# Patient Record
Sex: FEMALE | Race: ASIAN | NOT HISPANIC OR LATINO
[De-identification: names, ages, dates, MRNs, and addresses within clinical notes are randomized per-mention and may not be internally consistent; named-entity substitution may affect disease eponyms.]

---

## 2019-07-20 ENCOUNTER — APPOINTMENT (OUTPATIENT)
Dept: OTOLARYNGOLOGY | Facility: CLINIC | Age: 64
End: 2019-07-20
Payer: COMMERCIAL

## 2019-07-20 VITALS
DIASTOLIC BLOOD PRESSURE: 79 MMHG | HEART RATE: 73 BPM | HEIGHT: 65 IN | SYSTOLIC BLOOD PRESSURE: 143 MMHG | WEIGHT: 147 LBS | BODY MASS INDEX: 24.49 KG/M2

## 2019-07-20 DIAGNOSIS — H61.21 IMPACTED CERUMEN, RIGHT EAR: ICD-10-CM

## 2019-07-20 PROCEDURE — 69210 REMOVE IMPACTED EAR WAX UNI: CPT

## 2019-07-20 PROCEDURE — 99203 OFFICE O/P NEW LOW 30 MIN: CPT | Mod: 25

## 2019-07-20 NOTE — HISTORY OF PRESENT ILLNESS
[de-identified] : 62yo woman with R ear fullness\par about 2 wks ago after using headphnes\par no otalgia or otorrhea\par but was worried abut hearing\par since then has gotten better\par no otologic history\par no h/o dry skin

## 2019-07-20 NOTE — PHYSICAL EXAM
[de-identified] : dry cerumen, removed; TF ML AC>BC dawit, hears finger rub [Midline] : trachea located in midline position [Normal] : no rashes

## 2019-07-20 NOTE — REASON FOR VISIT
[Initial Evaluation] : an initial evaluation for [FreeTextEntry2] : patient complaint of right ear not hearing well and complaint of itchiness, left ear same symptoms but it comes and goes

## 2019-07-23 ENCOUNTER — APPOINTMENT (OUTPATIENT)
Dept: OTOLARYNGOLOGY | Facility: CLINIC | Age: 64
End: 2019-07-23

## 2019-12-09 ENCOUNTER — APPOINTMENT (OUTPATIENT)
Dept: OBGYN | Facility: CLINIC | Age: 64
End: 2019-12-09
Payer: COMMERCIAL

## 2019-12-09 VITALS
HEIGHT: 65 IN | HEART RATE: 93 BPM | DIASTOLIC BLOOD PRESSURE: 78 MMHG | SYSTOLIC BLOOD PRESSURE: 145 MMHG | BODY MASS INDEX: 24.83 KG/M2 | WEIGHT: 149 LBS

## 2019-12-09 DIAGNOSIS — F32.9 MAJOR DEPRESSIVE DISORDER, SINGLE EPISODE, UNSPECIFIED: ICD-10-CM

## 2019-12-09 PROCEDURE — 99213 OFFICE O/P EST LOW 20 MIN: CPT | Mod: 25

## 2019-12-09 PROCEDURE — 99386 PREV VISIT NEW AGE 40-64: CPT

## 2019-12-09 NOTE — COUNSELING
[Breast Self Exam] : breast self exam [Nutrition] : nutrition [Vitamins/Supplements] : vitamins/supplements [Exercise] : exercise [Other ___] : [unfilled]

## 2019-12-09 NOTE — PHYSICAL EXAM
[Awake] : awake [Alert] : alert [Soft] : soft [Oriented x3] : oriented to person, place, and time [Vulvar Atrophy] : vulvar atrophy [Normal] : vagina [Atrophy] : atrophy [Uterine Adnexae] : were not tender and not enlarged [No Bleeding] : there was no active vaginal bleeding [Mass] : no breast mass [Acute Distress] : no acute distress [Nipple Discharge] : no nipple discharge [Axillary LAD] : no axillary lymphadenopathy [Tender] : non tender

## 2019-12-10 ENCOUNTER — APPOINTMENT (OUTPATIENT)
Dept: OBGYN | Facility: CLINIC | Age: 64
End: 2019-12-10
Payer: COMMERCIAL

## 2019-12-10 ENCOUNTER — ASOB RESULT (OUTPATIENT)
Age: 64
End: 2019-12-10

## 2019-12-10 PROCEDURE — 76830 TRANSVAGINAL US NON-OB: CPT

## 2019-12-12 LAB
CYTOLOGY CVX/VAG DOC THIN PREP: NORMAL
HPV HIGH+LOW RISK DNA PNL CVX: NOT DETECTED

## 2019-12-31 ENCOUNTER — OUTPATIENT (OUTPATIENT)
Dept: OUTPATIENT SERVICES | Facility: HOSPITAL | Age: 64
LOS: 1 days | End: 2019-12-31
Payer: COMMERCIAL

## 2019-12-31 ENCOUNTER — APPOINTMENT (OUTPATIENT)
Dept: ULTRASOUND IMAGING | Facility: IMAGING CENTER | Age: 64
End: 2019-12-31
Payer: COMMERCIAL

## 2019-12-31 ENCOUNTER — APPOINTMENT (OUTPATIENT)
Dept: MAMMOGRAPHY | Facility: IMAGING CENTER | Age: 64
End: 2019-12-31
Payer: COMMERCIAL

## 2019-12-31 DIAGNOSIS — Z01.419 ENCOUNTER FOR GYNECOLOGICAL EXAMINATION (GENERAL) (ROUTINE) WITHOUT ABNORMAL FINDINGS: ICD-10-CM

## 2019-12-31 DIAGNOSIS — Z98.89 OTHER SPECIFIED POSTPROCEDURAL STATES: Chronic | ICD-10-CM

## 2019-12-31 DIAGNOSIS — Z90.49 ACQUIRED ABSENCE OF OTHER SPECIFIED PARTS OF DIGESTIVE TRACT: Chronic | ICD-10-CM

## 2019-12-31 DIAGNOSIS — R92.2 INCONCLUSIVE MAMMOGRAM: ICD-10-CM

## 2019-12-31 PROCEDURE — 77067 SCR MAMMO BI INCL CAD: CPT

## 2019-12-31 PROCEDURE — 77067 SCR MAMMO BI INCL CAD: CPT | Mod: 26

## 2019-12-31 PROCEDURE — 76641 ULTRASOUND BREAST COMPLETE: CPT | Mod: 26,50

## 2019-12-31 PROCEDURE — 77063 BREAST TOMOSYNTHESIS BI: CPT | Mod: 26

## 2019-12-31 PROCEDURE — 76641 ULTRASOUND BREAST COMPLETE: CPT

## 2019-12-31 PROCEDURE — 77063 BREAST TOMOSYNTHESIS BI: CPT

## 2020-01-22 ENCOUNTER — LABORATORY RESULT (OUTPATIENT)
Age: 65
End: 2020-01-22

## 2020-01-22 ENCOUNTER — APPOINTMENT (OUTPATIENT)
Dept: OBGYN | Facility: CLINIC | Age: 65
End: 2020-01-22
Payer: COMMERCIAL

## 2020-01-22 VITALS
DIASTOLIC BLOOD PRESSURE: 79 MMHG | WEIGHT: 148 LBS | BODY MASS INDEX: 24.66 KG/M2 | HEIGHT: 65 IN | SYSTOLIC BLOOD PRESSURE: 139 MMHG | HEART RATE: 96 BPM

## 2020-01-22 PROCEDURE — 58100 BIOPSY OF UTERUS LINING: CPT

## 2020-01-22 NOTE — PROCEDURE
[Endometrial Biopsy] : Endometrial biopsy [Benefits] : benefits [Risks] : risks [Alternatives] : alternatives [Infection] : infection [Patient] : patient [Bleeding] : bleeding [Allergic Reaction] : allergic reaction [Uterine Perforation] : uterine perforation [Pain] : pain [CONSENT OBTAINED] : written consent was obtained prior to the procedure. [Neg Pregnancy Test] : a pregnancy test was negative [Betadine] : Betadine [1%] : 1% [None] : none [Without Epi] : without epinephrine [Tenaculum] : a single toothed tenaculum [Anteverted] : anteverted [Easy Passage] : allowed easy passage of a uterine sound without dilation [Scant] : a scant [Pipelle] : a Pipelle endometrial suction curette [Sent to Histology] : the specimen was place in buffered formalin and sent for pathlogy [Tolerated Well] : the patient tolerated the procedure well [No Complications] : there were no complications

## 2020-01-30 LAB — CORE LAB BIOPSY: NORMAL

## 2020-02-06 ENCOUNTER — OUTPATIENT (OUTPATIENT)
Dept: OUTPATIENT SERVICES | Facility: HOSPITAL | Age: 65
LOS: 1 days | End: 2020-02-06
Payer: COMMERCIAL

## 2020-02-06 VITALS
HEIGHT: 63 IN | TEMPERATURE: 98 F | RESPIRATION RATE: 16 BRPM | HEART RATE: 72 BPM | OXYGEN SATURATION: 98 % | WEIGHT: 145.95 LBS | DIASTOLIC BLOOD PRESSURE: 84 MMHG | SYSTOLIC BLOOD PRESSURE: 126 MMHG

## 2020-02-06 DIAGNOSIS — N84.0 POLYP OF CORPUS UTERI: ICD-10-CM

## 2020-02-06 DIAGNOSIS — Z98.890 OTHER SPECIFIED POSTPROCEDURAL STATES: Chronic | ICD-10-CM

## 2020-02-06 DIAGNOSIS — Z98.49 CATARACT EXTRACTION STATUS, UNSPECIFIED EYE: Chronic | ICD-10-CM

## 2020-02-06 DIAGNOSIS — Z98.89 OTHER SPECIFIED POSTPROCEDURAL STATES: Chronic | ICD-10-CM

## 2020-02-06 DIAGNOSIS — Z90.49 ACQUIRED ABSENCE OF OTHER SPECIFIED PARTS OF DIGESTIVE TRACT: Chronic | ICD-10-CM

## 2020-02-06 LAB
ANION GAP SERPL CALC-SCNC: 13 MMO/L — SIGNIFICANT CHANGE UP (ref 7–14)
BUN SERPL-MCNC: 26 MG/DL — HIGH (ref 7–23)
CALCIUM SERPL-MCNC: 9.8 MG/DL — SIGNIFICANT CHANGE UP (ref 8.4–10.5)
CHLORIDE SERPL-SCNC: 102 MMOL/L — SIGNIFICANT CHANGE UP (ref 98–107)
CO2 SERPL-SCNC: 25 MMOL/L — SIGNIFICANT CHANGE UP (ref 22–31)
CREAT SERPL-MCNC: 0.65 MG/DL — SIGNIFICANT CHANGE UP (ref 0.5–1.3)
GLUCOSE SERPL-MCNC: 79 MG/DL — SIGNIFICANT CHANGE UP (ref 70–99)
HCT VFR BLD CALC: 47 % — HIGH (ref 34.5–45)
HGB BLD-MCNC: 14.7 G/DL — SIGNIFICANT CHANGE UP (ref 11.5–15.5)
MCHC RBC-ENTMCNC: 30.1 PG — SIGNIFICANT CHANGE UP (ref 27–34)
MCHC RBC-ENTMCNC: 31.3 % — LOW (ref 32–36)
MCV RBC AUTO: 96.3 FL — SIGNIFICANT CHANGE UP (ref 80–100)
NRBC # FLD: 0 K/UL — SIGNIFICANT CHANGE UP (ref 0–0)
PLATELET # BLD AUTO: 327 K/UL — SIGNIFICANT CHANGE UP (ref 150–400)
PMV BLD: 10.2 FL — SIGNIFICANT CHANGE UP (ref 7–13)
POTASSIUM SERPL-MCNC: 4.1 MMOL/L — SIGNIFICANT CHANGE UP (ref 3.5–5.3)
POTASSIUM SERPL-SCNC: 4.1 MMOL/L — SIGNIFICANT CHANGE UP (ref 3.5–5.3)
RBC # BLD: 4.88 M/UL — SIGNIFICANT CHANGE UP (ref 3.8–5.2)
RBC # FLD: 12.4 % — SIGNIFICANT CHANGE UP (ref 10.3–14.5)
SODIUM SERPL-SCNC: 140 MMOL/L — SIGNIFICANT CHANGE UP (ref 135–145)
WBC # BLD: 6.66 K/UL — SIGNIFICANT CHANGE UP (ref 3.8–10.5)
WBC # FLD AUTO: 6.66 K/UL — SIGNIFICANT CHANGE UP (ref 3.8–10.5)

## 2020-02-06 PROCEDURE — 93010 ELECTROCARDIOGRAM REPORT: CPT

## 2020-02-06 RX ORDER — SODIUM CHLORIDE 9 MG/ML
3 INJECTION INTRAMUSCULAR; INTRAVENOUS; SUBCUTANEOUS ONCE
Refills: 0 | Status: DISCONTINUED | OUTPATIENT
Start: 2020-02-10 | End: 2020-03-03

## 2020-02-06 RX ORDER — SODIUM CHLORIDE 9 MG/ML
1000 INJECTION, SOLUTION INTRAVENOUS
Refills: 0 | Status: DISCONTINUED | OUTPATIENT
Start: 2020-02-10 | End: 2020-03-03

## 2020-02-06 NOTE — H&P PST ADULT - HISTORY OF PRESENT ILLNESS
65 y/o  female   with pre op dx of menopausal disorder. According to pt, Last year she experienced post menopausal bleeding over 3 times; last was the summer of 2013. A sonogram was done in December 2013 that showed thick lining of the uterine wall. Scheduled for dilation and curettage hysteroscopy on 3/11/14.     pap smear 12/08/2019 showed "polyp" 65 y/o  female with no significant PMH presents to PST for pre op evaluation - S/P pap smear 12/08/2019 showed "polyp". Now scheduled for Dilation curettage hysteroscopy with Symp[hion on 02/10/2020

## 2020-02-06 NOTE — H&P PST ADULT - GASTROINTESTINAL DETAILS
soft/nontender/bowel sounds normal/no masses palpable/no distention no distention/no rebound tenderness/soft/no bruit/nontender/no masses palpable/bowel sounds normal

## 2020-02-06 NOTE — H&P PST ADULT - NEGATIVE GENERAL GENITOURINARY SYMPTOMS
no dysuria/no flank pain L/no renal colic/no hematuria/no flank pain R/no bladder infections/no urinary hesitancy/no urine discoloration/no gas in urine

## 2020-02-06 NOTE — H&P PST ADULT - NEGATIVE BREAST SYMPTOMS
no nipple discharge L/no breast lump L/no breast tenderness L/no breast tenderness R/no breast lump R/no nipple discharge R

## 2020-02-06 NOTE — H&P PST ADULT - NSICDXPASTSURGICALHX_GEN_ALL_CORE_FT
PAST SURGICAL HISTORY:  S/P appendectomy     S/P cholecystectomy     S/P dilation and curettage 2014    Status post cataract extraction bilateral; 3 years ago

## 2020-02-06 NOTE — H&P PST ADULT - NSANTHOSAYNRD_GEN_A_CORE
No. CRYSTAL screening performed.  STOP BANG Legend: 0-2 = LOW Risk; 3-4 = INTERMEDIATE Risk; 5-8 = HIGH Risk

## 2020-02-06 NOTE — H&P PST ADULT - NEGATIVE GENERAL SYMPTOMS
no polyphagia/no sweating/no anorexia/no weight loss/no chills/no fatigue/no fever/no weight gain/no malaise

## 2020-02-06 NOTE — H&P PST ADULT - NSICDXPROBLEM_GEN_ALL_CORE_FT
PROBLEM DIAGNOSES  Problem: Polyp of corpus uteri  Assessment and Plan: Scheduled for Dilation curettage hysteroscopy with Ray on 02/10/2020. Pre op instructions, famotidine given and explained. Pt verbalized understanding.

## 2020-02-06 NOTE — H&P PST ADULT - NSICDXPASTMEDICALHX_GEN_ALL_CORE_FT
PAST MEDICAL HISTORY:  Cataract of both eyes     Menopausal disorder     Stress incontinence     Urge incontinence of urine PAST MEDICAL HISTORY:  Cataract of both eyes     Menopausal disorder     Polyp of corpus uteri     Stress incontinence     Urge incontinence of urine

## 2020-02-06 NOTE — H&P PST ADULT - NEGATIVE OPHTHALMOLOGIC SYMPTOMS
no diplopia/no lacrimation R/no blurred vision L/corrective lenses/no blurred vision R/no discharge R/no irritation L/no photophobia/no lacrimation L/no pain L/no loss of vision R/no discharge L/no pain R/no irritation R/no loss of vision L

## 2020-02-06 NOTE — H&P PST ADULT - NEGATIVE GASTROINTESTINAL SYMPTOMS
no diarrhea/no nausea/no vomiting/no steatorrhea/no hiccoughs/no constipation/no abdominal pain/no melena/no jaundice

## 2020-02-06 NOTE — H&P PST ADULT - ACTIVITY
Assumed care of pt from Triage pt c/o abd pain and associated with N/V/D X 2 days no fever or sob does house chores

## 2020-02-07 NOTE — ASU PATIENT PROFILE, ADULT - PSH
S/P appendectomy    S/P cholecystectomy    S/P dilation and curettage  2014  Status post cataract extraction  bilateral; 3 years ago

## 2020-02-09 ENCOUNTER — TRANSCRIPTION ENCOUNTER (OUTPATIENT)
Age: 65
End: 2020-02-09

## 2020-02-10 ENCOUNTER — APPOINTMENT (OUTPATIENT)
Dept: OBGYN | Facility: HOSPITAL | Age: 65
End: 2020-02-10

## 2020-02-10 ENCOUNTER — RESULT REVIEW (OUTPATIENT)
Age: 65
End: 2020-02-10

## 2020-02-10 ENCOUNTER — OUTPATIENT (OUTPATIENT)
Dept: OUTPATIENT SERVICES | Facility: HOSPITAL | Age: 65
LOS: 1 days | Discharge: HOME | End: 2020-02-10
Payer: COMMERCIAL

## 2020-02-10 VITALS
HEART RATE: 68 BPM | OXYGEN SATURATION: 100 % | RESPIRATION RATE: 13 BRPM | SYSTOLIC BLOOD PRESSURE: 140 MMHG | DIASTOLIC BLOOD PRESSURE: 73 MMHG

## 2020-02-10 VITALS
WEIGHT: 145.95 LBS | HEIGHT: 63 IN | TEMPERATURE: 99 F | RESPIRATION RATE: 16 BRPM | DIASTOLIC BLOOD PRESSURE: 71 MMHG | SYSTOLIC BLOOD PRESSURE: 137 MMHG | OXYGEN SATURATION: 98 % | HEART RATE: 75 BPM

## 2020-02-10 DIAGNOSIS — N84.0 POLYP OF CORPUS UTERI: ICD-10-CM

## 2020-02-10 DIAGNOSIS — Z90.49 ACQUIRED ABSENCE OF OTHER SPECIFIED PARTS OF DIGESTIVE TRACT: Chronic | ICD-10-CM

## 2020-02-10 DIAGNOSIS — Z98.49 CATARACT EXTRACTION STATUS, UNSPECIFIED EYE: Chronic | ICD-10-CM

## 2020-02-10 DIAGNOSIS — Z98.89 OTHER SPECIFIED POSTPROCEDURAL STATES: Chronic | ICD-10-CM

## 2020-02-10 DIAGNOSIS — Z98.890 OTHER SPECIFIED POSTPROCEDURAL STATES: Chronic | ICD-10-CM

## 2020-02-10 PROCEDURE — 58558 HYSTEROSCOPY BIOPSY: CPT

## 2020-02-10 PROCEDURE — 88305 TISSUE EXAM BY PATHOLOGIST: CPT | Mod: 26

## 2020-02-10 RX ORDER — SODIUM CHLORIDE 9 MG/ML
1000 INJECTION, SOLUTION INTRAVENOUS
Refills: 0 | Status: DISCONTINUED | OUTPATIENT
Start: 2020-02-10 | End: 2020-03-03

## 2020-02-10 RX ADMIN — SODIUM CHLORIDE 125 MILLILITER(S): 9 INJECTION, SOLUTION INTRAVENOUS at 10:05

## 2020-02-10 NOTE — ASU DISCHARGE PLAN (ADULT/PEDIATRIC) - NURSING INSTRUCTIONS
You were given intravenous TYLENOL for pain management at _9:30am Please DO NOT take any products containing TYLENOL or ACETAMINOPHEN, such as VICODIN, PERCOCET, EXCEDRIN, and any over-the-counter cold medication for the next 6 hours (until _3:30pm). DO NOT TAKE MORE THAN 3000 MG OF TYLENOL in a 24 hour period.     You were given intravenous TORADOL for pain management at 9:45am. Please DO NOT take ibuprofen containing products such as MOTRIN or ADVIL, or any other NSAIDs (Non-Steroidal Anti-Inflammatory Drugs) such as ALEVE for the next 6 hours (until _3:45pm_).

## 2020-02-10 NOTE — ASU DISCHARGE PLAN (ADULT/PEDIATRIC) - CARE PROVIDER_API CALL
Cinda Epps)  Obstetrics and Gynecology  [address] [place] [address]  Phone: [phone]  Fax: [phone]  Follow Up Time:

## 2020-02-10 NOTE — BRIEF OPERATIVE NOTE - NSICDXBRIEFPROCEDURE_GEN_ALL_CORE_FT
PROCEDURES:  Diagnostic hysteroscopy with dilation and curettage of uterus 10-Feb-2020 10:04:01  Rogerio Chadwick

## 2020-02-10 NOTE — BRIEF OPERATIVE NOTE - OPERATION/FINDINGS
small posterior wall polyp in uterine cavity, grossly atrophic uterine cavity, b/l ostia identified and normal

## 2020-02-13 LAB — SURGICAL PATHOLOGY STUDY: SIGNIFICANT CHANGE UP

## 2020-03-11 ENCOUNTER — APPOINTMENT (OUTPATIENT)
Dept: OBGYN | Facility: CLINIC | Age: 65
End: 2020-03-11
Payer: COMMERCIAL

## 2020-03-11 VITALS
HEIGHT: 65 IN | DIASTOLIC BLOOD PRESSURE: 73 MMHG | WEIGHT: 150 LBS | BODY MASS INDEX: 24.99 KG/M2 | HEART RATE: 85 BPM | SYSTOLIC BLOOD PRESSURE: 150 MMHG

## 2020-03-11 DIAGNOSIS — N84.0 POLYP OF CORPUS UTERI: ICD-10-CM

## 2020-03-11 DIAGNOSIS — R93.5 ABNORMAL FINDINGS ON DIAGNOSTIC IMAGING OF OTHER ABDOMINAL REGIONS, INCLUDING RETROPERITONEUM: ICD-10-CM

## 2020-03-11 PROCEDURE — 99213 OFFICE O/P EST LOW 20 MIN: CPT

## 2020-12-21 PROBLEM — N84.0 POLYP OF CORPUS UTERI: Chronic | Status: ACTIVE | Noted: 2020-02-06

## 2020-12-31 NOTE — H&P PST ADULT - SOURCE OF INFORMATION, PROFILE
The following labs labeled with pt sticker and tubed:     [] Butcher Edwards   [] on ice   [] Blue   [x] Green/yellow (pedi tube)  [] Green/black [] on ice  [] Pink  [] Red  [] Yellow     [] COVID-19 swab    [] Rapid     [] Urine Sample  [] Pelvic Cultures    [] Blood Cultures          Jeff Her RN  12/31/20 [id number] patient

## 2021-04-22 ENCOUNTER — APPOINTMENT (OUTPATIENT)
Dept: OTOLARYNGOLOGY | Facility: CLINIC | Age: 66
End: 2021-04-22
Payer: MEDICARE

## 2021-04-22 PROCEDURE — 92557 COMPREHENSIVE HEARING TEST: CPT

## 2021-04-22 PROCEDURE — 99215 OFFICE O/P EST HI 40 MIN: CPT

## 2021-04-22 PROCEDURE — 92567 TYMPANOMETRY: CPT

## 2021-04-22 NOTE — HISTORY OF PRESENT ILLNESS
[de-identified] : 64 y/o woman presents for c/o ear pain and fluid in her ear about two weeks ago.  This has since resolved and she feels it is related to "ear bud use".  Pt. also with c/o mild intermittent constant tinnitus, especially in quiet environment.  Recently had some hearing loss on workplace evaluation.  Would like her hearing checked.  Denies ear infections or previous ear surgery.

## 2021-05-03 ENCOUNTER — APPOINTMENT (OUTPATIENT)
Dept: OBGYN | Facility: CLINIC | Age: 66
End: 2021-05-03
Payer: MEDICARE

## 2021-05-03 VITALS
HEIGHT: 65 IN | BODY MASS INDEX: 25.16 KG/M2 | DIASTOLIC BLOOD PRESSURE: 92 MMHG | HEART RATE: 90 BPM | SYSTOLIC BLOOD PRESSURE: 165 MMHG | WEIGHT: 151 LBS | TEMPERATURE: 97.1 F

## 2021-05-03 VITALS — HEART RATE: 88 BPM | DIASTOLIC BLOOD PRESSURE: 81 MMHG | SYSTOLIC BLOOD PRESSURE: 143 MMHG

## 2021-05-03 DIAGNOSIS — R10.2 PELVIC AND PERINEAL PAIN: ICD-10-CM

## 2021-05-03 DIAGNOSIS — Z01.419 ENCOUNTER FOR GYNECOLOGICAL EXAMINATION (GENERAL) (ROUTINE) W/OUT ABNORMAL FINDINGS: ICD-10-CM

## 2021-05-03 PROCEDURE — G0101: CPT

## 2021-05-04 ENCOUNTER — APPOINTMENT (OUTPATIENT)
Dept: OBGYN | Facility: CLINIC | Age: 66
End: 2021-05-04

## 2021-05-11 ENCOUNTER — APPOINTMENT (OUTPATIENT)
Dept: OBGYN | Facility: CLINIC | Age: 66
End: 2021-05-11
Payer: MEDICARE

## 2021-05-11 ENCOUNTER — ASOB RESULT (OUTPATIENT)
Age: 66
End: 2021-05-11

## 2021-05-11 PROCEDURE — 76830 TRANSVAGINAL US NON-OB: CPT

## 2021-05-18 ENCOUNTER — NON-APPOINTMENT (OUTPATIENT)
Age: 66
End: 2021-05-18

## 2021-05-20 ENCOUNTER — APPOINTMENT (OUTPATIENT)
Dept: MAMMOGRAPHY | Facility: IMAGING CENTER | Age: 66
End: 2021-05-20
Payer: MEDICARE

## 2021-05-20 ENCOUNTER — OUTPATIENT (OUTPATIENT)
Dept: OUTPATIENT SERVICES | Facility: HOSPITAL | Age: 66
LOS: 1 days | End: 2021-05-20
Payer: MEDICARE

## 2021-05-20 ENCOUNTER — RESULT REVIEW (OUTPATIENT)
Age: 66
End: 2021-05-20

## 2021-05-20 ENCOUNTER — APPOINTMENT (OUTPATIENT)
Dept: RADIOLOGY | Facility: IMAGING CENTER | Age: 66
End: 2021-05-20
Payer: MEDICARE

## 2021-05-20 DIAGNOSIS — Z01.419 ENCOUNTER FOR GYNECOLOGICAL EXAMINATION (GENERAL) (ROUTINE) WITHOUT ABNORMAL FINDINGS: ICD-10-CM

## 2021-05-20 DIAGNOSIS — Z90.49 ACQUIRED ABSENCE OF OTHER SPECIFIED PARTS OF DIGESTIVE TRACT: Chronic | ICD-10-CM

## 2021-05-20 DIAGNOSIS — Z98.49 CATARACT EXTRACTION STATUS, UNSPECIFIED EYE: Chronic | ICD-10-CM

## 2021-05-20 DIAGNOSIS — Z98.890 OTHER SPECIFIED POSTPROCEDURAL STATES: Chronic | ICD-10-CM

## 2021-05-20 DIAGNOSIS — Z98.89 OTHER SPECIFIED POSTPROCEDURAL STATES: Chronic | ICD-10-CM

## 2021-05-20 PROCEDURE — 77067 SCR MAMMO BI INCL CAD: CPT | Mod: 26

## 2021-05-20 PROCEDURE — 77080 DXA BONE DENSITY AXIAL: CPT | Mod: 26

## 2021-05-20 PROCEDURE — 77063 BREAST TOMOSYNTHESIS BI: CPT | Mod: 26

## 2021-05-20 PROCEDURE — 77067 SCR MAMMO BI INCL CAD: CPT

## 2021-05-20 PROCEDURE — 77063 BREAST TOMOSYNTHESIS BI: CPT

## 2021-05-20 PROCEDURE — 77080 DXA BONE DENSITY AXIAL: CPT

## 2021-06-02 ENCOUNTER — NON-APPOINTMENT (OUTPATIENT)
Age: 66
End: 2021-06-02

## 2021-06-03 ENCOUNTER — NON-APPOINTMENT (OUTPATIENT)
Age: 66
End: 2021-06-03

## 2021-07-26 ENCOUNTER — APPOINTMENT (OUTPATIENT)
Dept: OBGYN | Facility: CLINIC | Age: 66
End: 2021-07-26
Payer: MEDICARE

## 2021-07-26 VITALS
HEART RATE: 83 BPM | HEIGHT: 65 IN | DIASTOLIC BLOOD PRESSURE: 84 MMHG | BODY MASS INDEX: 25.87 KG/M2 | TEMPERATURE: 97.6 F | WEIGHT: 155.25 LBS | SYSTOLIC BLOOD PRESSURE: 151 MMHG

## 2021-07-26 PROCEDURE — 99214 OFFICE O/P EST MOD 30 MIN: CPT

## 2021-07-26 NOTE — COUNSELING
[FreeTextEntry1] : Alysha 65-year-old para 4  4 with predominantly urge incontinence, small cystocele and generalized slight pelvic floor weakness and occasional stress incontinence.  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  She chooses physical therapy for now and will see me back again in 6 to 8 months to assess her progress and other therapies will be added as necessary\par \par Renal ultrasound was ordered to rule out structural issues including hydronephrosis, urine analysis culture and cytology was sent, postvoid residual is normal, and voiding diary was given.  Also literature provided and 3D and computer modeling utilized to demonstrate the patient's situation to her\par \par Her gynecologist is Dr. Trudy Humphrey however there was a discharge present today and some cervical bleeding predominantly in all likelihood due to atrophy therefore Pap and affirm with HPV was taken and I will report this back to Dr. Huertas and to the patient\par \par Pelvic sonogram was done and revealed a 4.5 mm probable polyp as she has had polypectomy D&C x2 all benign in the past and she is aware that if she has any staining or bleeding she is to report this to me and/or Dr. Huertas and appropriate measures will be taken including investigation; some adnexal tenderness will re-order ta tv pelvic sono as ovaries were not seen at last sono and adnexal exam although nl with me and Dr. Gardiner, some gas present and difficult to get deep in to adnexae. also fh+M ov ca pm\par \par The patient has had the opportunity to ask questions which have been answered to her apparent satisfaction\par KEILY

## 2021-07-27 LAB
APPEARANCE: CLEAR
BACTERIA UR CULT: NORMAL
BACTERIA: NEGATIVE
BILIRUBIN URINE: NEGATIVE
BLOOD URINE: NEGATIVE
CANDIDA VAG CYTO: NOT DETECTED
COLOR: NORMAL
G VAGINALIS+PREV SP MTYP VAG QL MICRO: DETECTED
GLUCOSE QUALITATIVE U: NEGATIVE
HPV HIGH+LOW RISK DNA PNL CVX: NOT DETECTED
HYALINE CASTS: 0 /LPF
KETONES URINE: NEGATIVE
LEUKOCYTE ESTERASE URINE: NEGATIVE
MICROSCOPIC-UA: NORMAL
NITRITE URINE: NEGATIVE
PH URINE: 6
PROTEIN URINE: NEGATIVE
RED BLOOD CELLS URINE: 1 /HPF
SPECIFIC GRAVITY URINE: 1.02
SQUAMOUS EPITHELIAL CELLS: 0 /HPF
T VAGINALIS VAG QL WET PREP: NOT DETECTED
URINE CYTOLOGY: NORMAL
UROBILINOGEN URINE: NORMAL
WHITE BLOOD CELLS URINE: 0 /HPF

## 2021-07-29 LAB — CYTOLOGY CVX/VAG DOC THIN PREP: ABNORMAL

## 2021-08-03 ENCOUNTER — NON-APPOINTMENT (OUTPATIENT)
Age: 66
End: 2021-08-03

## 2021-08-11 ENCOUNTER — APPOINTMENT (OUTPATIENT)
Dept: ULTRASOUND IMAGING | Facility: IMAGING CENTER | Age: 66
End: 2021-08-11
Payer: MEDICARE

## 2021-08-11 ENCOUNTER — OUTPATIENT (OUTPATIENT)
Dept: OUTPATIENT SERVICES | Facility: HOSPITAL | Age: 66
LOS: 1 days | End: 2021-08-11
Payer: MEDICARE

## 2021-08-11 ENCOUNTER — RESULT REVIEW (OUTPATIENT)
Age: 66
End: 2021-08-11

## 2021-08-11 DIAGNOSIS — N81.9 FEMALE GENITAL PROLAPSE, UNSPECIFIED: ICD-10-CM

## 2021-08-11 DIAGNOSIS — Z98.89 OTHER SPECIFIED POSTPROCEDURAL STATES: Chronic | ICD-10-CM

## 2021-08-11 DIAGNOSIS — Z98.49 CATARACT EXTRACTION STATUS, UNSPECIFIED EYE: Chronic | ICD-10-CM

## 2021-08-11 DIAGNOSIS — Z98.890 OTHER SPECIFIED POSTPROCEDURAL STATES: Chronic | ICD-10-CM

## 2021-08-11 DIAGNOSIS — Z90.49 ACQUIRED ABSENCE OF OTHER SPECIFIED PARTS OF DIGESTIVE TRACT: Chronic | ICD-10-CM

## 2021-08-11 PROCEDURE — 76856 US EXAM PELVIC COMPLETE: CPT | Mod: 26

## 2021-08-11 PROCEDURE — 76830 TRANSVAGINAL US NON-OB: CPT | Mod: 26

## 2021-08-11 PROCEDURE — 76856 US EXAM PELVIC COMPLETE: CPT

## 2021-08-11 PROCEDURE — 76770 US EXAM ABDO BACK WALL COMP: CPT | Mod: 26

## 2021-08-11 PROCEDURE — 76830 TRANSVAGINAL US NON-OB: CPT

## 2021-08-11 PROCEDURE — 76770 US EXAM ABDO BACK WALL COMP: CPT

## 2021-08-13 ENCOUNTER — NON-APPOINTMENT (OUTPATIENT)
Age: 66
End: 2021-08-13

## 2021-08-22 NOTE — HISTORY OF PRESENT ILLNESS
[FreeTextEntry1] : Alysha 65-year-old para 4  4 with complaints of urgency frequency, double voiding and occasional urge incontinence with nocturia 2-3 times per night.  Occasionally with a full bladder she does have some stress incontinence symptoms and no bulging or vaginal or pelvic pressure symptoms per se.  She is referred from gynecologist Dr. Trudy Huertas and read recent mammogram was normal pelvic ultrasound reveals a 4.5 mm probable endometrial polyp\par She has had 2 D&Cs because of polyps and bleeding however she has not had bleeding in quite some time. [Mammogramdate] : 12/19 [PapSmeardate] : 12/19 [PGHxTotal] : 4 [[facility]] : 4 [[facility]] : 4 [Cystocele (Obstetric)] : no [Uterine Prolapse] : no [Vaginal Wall Prolapse] : no [Rectal Prolapse] : no [Stress Incontinence] : no [Urge Incontinence Of Urine] : no [Unable To Restrain Bowel Movement] : moderate [x2] : nocturia two times a night [Urinary Stream Starts And Stops] : no [Incomplete Emptying Of Bladder] : no [Urinary Frequency] : no [Feelings Of Urinary Urgency] : no [Pain During Urination (Dysuria)] : no [Urinary Tract Infection] : no [Hematuria] : no [Constipation Obstructed Defecation] : no [Incomplete Emptying Of Stool] : no [Stool Visible Blood] : no [Diarrhea] : no [Pelvic Pain] : no [Vaginal Pain] : no [Vulvar Pain] : no [Bladder Pain] : no [Rectal Pain] : no [Back Pain] : no [Sexual Dysfunction, NOS] : no [] : no [LMP unknown] : LMP unknown [postmenopausal] : postmenopausal [N] : Patient is not sexually active [Y] : Positive pregnancy history

## 2021-08-22 NOTE — PHYSICAL EXAM
[Mass] : no breast mass [Tender] : no tenderness [Nipple Discharge] : no nipple discharge [Mass (___ Cm)] : no ~M [unfilled] abdominal mass was palpated [Tenderness] : ~T no ~M abdominal tenderness observed [H/Smegaly] : no hepatosplenomegaly [Supraclavicular LAD] : no adenopathy noted in supraclavicular lymph nodes [Axillary LAD] : no adenopathy was noted in axillary nodes [Inguinal LAD] : no adenopathy was noted in the inguinal lymph nodes [Rash/Lesion] : no rash or lesion was noted [Estrogen Effect] : no estrogen effect was observed [FreeTextEntry4] : grade 1 cystocele, no prolapse [Chaperone Present] : A chaperone was present in the examining room during all aspects of the physical examination [No Acute Distress] : in no acute distress [Well developed] : well developed [Well Nourished] : ~L well nourished [Good Hygeine] : demonstrates good hygeine [Oriented x3] : oriented to person, place, and time [Normal Memory] : ~T memory was ~L unimpaired [Normal Mood/Affect] : mood and affect are normal [Normal Lung Sounds] : the lungs were clear to auscultation [Respirations regular] : ~T respiratory rate was regular [Rate & Rhythm Regular] : ~T heart rate and rhythm were normal [No Edema] : ~T edema was not present [Supple] : ~T the neck demonstrated no ~M decrease in suppleness [Thyroid Normal] : the thyroid ~T showed no abnormalities [Symmetrical] : the neck was ~L symmetrical [Warm and Dry] : was warm and dry to touch [Turgor Normal] : skin turgor ~T was normal [Normal Gait] : gait was normal [No Joint Swelling] : there was no joint swelling [No Clubbing, Cyanosis] : no clubbing or cyanosis of the fingernails [Normal Strength/Tone] : muscle strength and tone were normal [Vulvar Atrophy] : vulvar atrophy [Normal Appearance] : general appearance was normal [Atrophy] : atrophy [4] : 4 [Aa ____] : Aa [unfilled] [Ba ____] : Ba [unfilled] [C ____] : C [unfilled] [GH ____] : GH [unfilled] [PB ____] : PB [unfilled] [TVL ____] : TVL  [unfilled] [Ap ____] : Ap [unfilled] [Bp ____] : Bp [unfilled] [D ____] : D [unfilled] [] : I [Normal rectal exam] : was normal [None] : no [Examination Of The Breasts] : a normal appearance [No Discharge] : no discharge [No Masses] : no breast masses were palpable [Labia Majora] : normal [Labia Minora] : normal [Cystocele] : a cystocele [Normal] : normal [Uterine Adnexae] : normal [No Tenderness] : no tenderness [Nl Sphincter Tone] : normal sphincter tone

## 2021-08-22 NOTE — REASON FOR VISIT
[Initial Visit ___] : an initial visit for [unfilled] [Questionnaire Received] : Patient questionnaire received [Intake Form Reviewed] : Patient intake form with past medical history, surgical history, family history and social history reviewed today [Urinary Incontinence] : urinary incontinence [Urinary Urgency] : urinary urgency [Urine Frequency] : urine frequency [Bothersome Level: ____/10] : Bothersome Level: [unfilled]/10 [FreeTextEntry2] : gsui at capacity [Follow-Up Visit_____] : a follow-up visit for [unfilled]

## 2021-08-22 NOTE — OB HISTORY
[Abnormal Pap Smear] : normal pap smear [Taking Estrogens] : is not taking estrogen replacement [Abnormal Bleeding] : without bleeding [Sexually Active] : is not sexually active [Total Preg ___] : : [unfilled] [Vaginal ___] : [unfilled] vaginal delivery(s) [unknown] : the patient is unsure of the date of her LMP [Normal Amount/Duration] : was of a normal amount and duration [Regular Cycle Intervals] : periods have been regular [Last Pap Smear ___] : date of last pap smear was on [unfilled]

## 2021-08-23 ENCOUNTER — APPOINTMENT (OUTPATIENT)
Dept: OBGYN | Facility: CLINIC | Age: 66
End: 2021-08-23
Payer: MEDICARE

## 2021-08-23 VITALS
HEIGHT: 65 IN | TEMPERATURE: 97.9 F | SYSTOLIC BLOOD PRESSURE: 146 MMHG | DIASTOLIC BLOOD PRESSURE: 77 MMHG | HEART RATE: 82 BPM

## 2021-08-23 PROCEDURE — 99213 OFFICE O/P EST LOW 20 MIN: CPT

## 2021-08-23 NOTE — PHYSICAL EXAM
[Mass] : no breast mass [Tender] : no tenderness [Nipple Discharge] : no nipple discharge [Mass (___ Cm)] : no ~M [unfilled] abdominal mass was palpated [Tenderness] : ~T no ~M abdominal tenderness observed [H/Smegaly] : no hepatosplenomegaly [Supraclavicular LAD] : no adenopathy noted in supraclavicular lymph nodes [Axillary LAD] : no adenopathy was noted in axillary nodes [Inguinal LAD] : no adenopathy was noted in the inguinal lymph nodes [Rash/Lesion] : no rash or lesion was noted [Estrogen Effect] : no estrogen effect was observed [FreeTextEntry4] : grade 1 cystocele, no prolapse

## 2021-08-23 NOTE — COUNSELING
[FreeTextEntry1] : Alysha 65-year-old para 4  4 with predominantly urge incontinence, small cystocele and generalized slight pelvic floor weakness and occasional stress incontinence.  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  She chooses physical therapy for now at [facility] and will see me back again in 6 to 8 months to assess her progress and other therapies will be added as necessary\par \par Renal ultrasound was ordered to rule out structural issues including hydronephrosis, urine analysis culture and cytology was sent, postvoid residual is normal, and voiding diary was given.  Also literature provided and 3D and computer modeling utilized to demonstrate the patient's situation to her-normal pelvic and renal sono \par \par Her gynecologist is Dr. Trudy Humphrey however there was a discharge present today and some cervical bleeding predominantly in all likelihood due to atrophy therefore Pap and affirm with HPV was taken and I will report this back to Dr. Huertas and to the patient-neg pap/hpv  affirm was + and treated, veronique today\par \par Pelvic sonogram was done and revealed a 4.5 mm probable polyp as she has had polypectomy D&C x2 all benign in the past and she is aware that if she has any staining or bleeding she is to report this to me and/or Dr. Huertas and appropriate measures will be taken including investigation; some adnexal tenderness will re-order ta tv pelvic sono as ovaries were not seen at last sono and adnexal exam although nl with me and Dr. Gardiner, some gas present and difficult to get deep in to adnexae. also fh+M ov ca pm-NORMAL PELVIC AND RENAL SONO AS ABOVE RPT AS CLINICALLY INDICATED\par \par The patient has had the opportunity to ask questions which have been answered to her apparent satisfaction\par JRABIN\par \par Patient comes in today for test of cure for bacterial vaginosis admitting that she only used the medication for 1 night and I will repeat the test of cure today and it is pending if it still positive she will complete the course another 4 nights and a test of cure sometime in September or October and a follow-up in January for her biannual visit, she is going to continue to try physical therapy and make an appointment for formal physical therapy at [facility] physical therapy for her incontinence and pelvic organ prolapse, additionally if her test of cure is negative I will just monitor her again for that in .  All questions answered to the patient's apparent satisfaction\tim SANON

## 2021-08-23 NOTE — HISTORY OF PRESENT ILLNESS
[] : no [FreeTextEntry1] : Alysha 65-year-old para 4  4 with complaints of urgency frequency, double voiding and occasional urge incontinence with nocturia 2-3 times per night.  Occasionally with a full bladder she does have some stress incontinence symptoms and no bulging or vaginal or pelvic pressure symptoms per se.  She is referred from gynecologist Dr. Trudy Huertas and read recent mammogram was normal pelvic ultrasound reveals a 4.5 mm probable endometrial polyp\par She has had 2 D&Cs because of polyps and bleeding however she has not had bleeding in quite some time. [Mammogramdate] : 12/19 [PapSmeardate] : 12/19 [PGHxTotal] : 4 [[facility] [[facility]xLiving] : 4

## 2021-08-23 NOTE — OB HISTORY
[Abnormal Pap Smear] : normal pap smear [Taking Estrogens] : is not taking estrogen replacement [Abnormal Bleeding] : without bleeding [Sexually Active] : is not sexually active

## 2021-08-24 LAB
CANDIDA VAG CYTO: NOT DETECTED
G VAGINALIS+PREV SP MTYP VAG QL MICRO: DETECTED
T VAGINALIS VAG QL WET PREP: NOT DETECTED

## 2021-08-24 RX ORDER — CLINDAMYCIN PHOSPHATE 20 MG/G
2 CREAM VAGINAL
Qty: 1 | Refills: 3 | Status: ACTIVE | COMMUNITY
Start: 2021-07-27 | End: 1900-01-01

## 2021-08-29 NOTE — H&P PST ADULT - RESPIRATORY
Discharge to home with plans to start Prilosec May use Percocet for pain and follow-up with your outpatient provider as well as possibility of being scoped as discussed.  
detailed exam

## 2021-09-10 ENCOUNTER — APPOINTMENT (OUTPATIENT)
Dept: OBGYN | Facility: CLINIC | Age: 66
End: 2021-09-10
Payer: MEDICARE

## 2021-09-10 VITALS — HEART RATE: 88 BPM | SYSTOLIC BLOOD PRESSURE: 125 MMHG | HEIGHT: 65 IN | DIASTOLIC BLOOD PRESSURE: 74 MMHG

## 2021-09-10 DIAGNOSIS — N39.0 URINARY TRACT INFECTION, SITE NOT SPECIFIED: ICD-10-CM

## 2021-09-10 DIAGNOSIS — A49.9 URINARY TRACT INFECTION, SITE NOT SPECIFIED: ICD-10-CM

## 2021-09-10 PROCEDURE — 99213 OFFICE O/P EST LOW 20 MIN: CPT | Mod: 25

## 2021-09-10 PROCEDURE — 51798 US URINE CAPACITY MEASURE: CPT

## 2021-09-10 NOTE — COUNSELING
[FreeTextEntry1] : Alysha 65-year-old para 4  4 with predominantly urge incontinence, small cystocele and generalized slight pelvic floor weakness and occasional stress incontinence.  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  [facility] and will see me back again in 6 to 8 months to assess her progress and other therapies will be added as necessary\par \par Renal ultrasound was ordered to rule out structural issues including hydronephrosis, urine analysis culture and cytology was sent, postvoid residual is normal, and voiding diary was given.  Also literature provided and 3D and computer modeling utilized to demonstrate the patient's situation to her-normal pelvic and renal sono \par \par Her gynecologist is Dr. Trudy Humphrey however there was a discharge present today and some cervical bleeding predominantly in all likelihood due to atrophy therefore Pap and affirm with HPV was taken and I will report this back to Dr. Huertas and to the patient-neg pap/hpv  affirm was + and treated, veronique today 9/10/21 with affirm and ua uc; pvr nl\par \par Pelvic sonogram was done and revealed a 4.5 mm probable polyp as she has had polypectomy D&C x2 all benign in the past and she is aware that if she has any staining or bleeding she is to report this to me and/or Dr. Huertas and appropriate measures will be taken including investigation; some adnexal tenderness will re-order ta tv pelvic sono as ovaries were not seen at last sono and adnexal exam although nl with me and Dr. Gardiner, some gas present and difficult to get deep in to adnexae. also fh+M ov ca pm-NORMAL PELVIC AND RENAL SONO AS ABOVE RPT AS CLINICALLY INDICATED\par \par The patient has had the opportunity to ask questions which have been answered to her apparent satisfaction\par JRABIN\par \par Patient comes in today for test of cure for bacterial vaginosis admitting that she only used the medication for 1 night and I will repeat the test of cure today and it is pending if it still positive she will complete the course another 4 nights and a test of cure sometime in September or October and a follow-up in January for her biannual visit, she is going to continue to try physical therapy and make an appointment for formal physical therapy at [facility] physical therapy for her incontinence and pelvic organ prolapse, additionally if her test of cure is negative I will just monitor her again for that in .  All questions answered to the patient's apparent satisfaction\tim SANON

## 2021-09-10 NOTE — HISTORY OF PRESENT ILLNESS
[] : no [FreeTextEntry1] : Alysha 65-year-old para 4  4 with complaints of urgency frequency, double voiding and occasional urge incontinence with nocturia 2-3 times per night.  Occasionally with a full bladder she does have some stress incontinence symptoms and no bulging or vaginal or pelvic pressure symptoms per se.  She is referred from gynecologist Dr. Trudy Huertas and read recent mammogram was normal pelvic ultrasound reveals a 4.5 mm probable endometrial polyp\par She has had 2 D&Cs because of polyps and bleeding however she has not had bleeding in quite some time. [Mammogramdate] : 12/19 [PapSmeardate] : 12/19 [PGHxTotal] : 4 [[facility] [[facility] : 4

## 2021-09-11 LAB
APPEARANCE: CLEAR
BACTERIA UR CULT: NORMAL
BACTERIA: NEGATIVE
BILIRUBIN URINE: NEGATIVE
BLOOD URINE: NEGATIVE
CANDIDA VAG CYTO: NOT DETECTED
COLOR: YELLOW
G VAGINALIS+PREV SP MTYP VAG QL MICRO: NOT DETECTED
GLUCOSE QUALITATIVE U: NEGATIVE
HYALINE CASTS: 0 /LPF
KETONES URINE: NEGATIVE
LEUKOCYTE ESTERASE URINE: NEGATIVE
MICROSCOPIC-UA: NORMAL
NITRITE URINE: NEGATIVE
PH URINE: 5.5
PROTEIN URINE: NORMAL
RED BLOOD CELLS URINE: 3 /HPF
SPECIFIC GRAVITY URINE: 1.02
SQUAMOUS EPITHELIAL CELLS: 2 /HPF
T VAGINALIS VAG QL WET PREP: NOT DETECTED
UROBILINOGEN URINE: NORMAL
WHITE BLOOD CELLS URINE: 1 /HPF

## 2021-09-13 ENCOUNTER — NON-APPOINTMENT (OUTPATIENT)
Age: 66
End: 2021-09-13

## 2022-04-03 ENCOUNTER — APPOINTMENT (OUTPATIENT)
Dept: OBGYN | Facility: CLINIC | Age: 67
End: 2022-04-03
Payer: MEDICARE

## 2022-04-03 VITALS — DIASTOLIC BLOOD PRESSURE: 80 MMHG | SYSTOLIC BLOOD PRESSURE: 146 MMHG | HEIGHT: 65 IN | HEART RATE: 91 BPM

## 2022-04-03 DIAGNOSIS — N95.2 POSTMENOPAUSAL ATROPHIC VAGINITIS: ICD-10-CM

## 2022-04-03 PROCEDURE — 99214 OFFICE O/P EST MOD 30 MIN: CPT

## 2022-04-03 RX ORDER — COVID-19 ANTIGEN TEST
KIT MISCELLANEOUS
Qty: 8 | Refills: 0 | Status: ACTIVE | COMMUNITY
Start: 2022-03-05

## 2022-04-03 NOTE — OB HISTORY
[Total Preg ___] : : [unfilled] [Vaginal ___] : [unfilled] vaginal delivery(s) [unknown] : the patient is unsure of the date of her LMP [Normal Amount/Duration] : was of a normal amount and duration [Regular Cycle Intervals] : periods have been regular [Last Pap Smear ___] : date of last pap smear was on [unfilled] [Abnormal Pap Smear] : normal pap smear [Taking Estrogens] : is not taking estrogen replacement [Abnormal Bleeding] : without bleeding [Sexually Active] : is not sexually active

## 2022-04-03 NOTE — PHYSICAL EXAM
[Chaperone Present] : A chaperone was present in the examining room during all aspects of the physical examination [No Acute Distress] : in no acute distress [Well developed] : well developed [Well Nourished] : ~L well nourished [Good Hygeine] : demonstrates good hygeine [Normal Memory] : ~T memory was ~L unimpaired [Oriented x3] : oriented to person, place, and time [Normal Mood/Affect] : mood and affect are normal [Normal Lung Sounds] : the lungs were clear to auscultation [Respirations regular] : ~T respiratory rate was regular [Rate & Rhythm Regular] : ~T heart rate and rhythm were normal [No Edema] : ~T edema was not present [Supple] : ~T the neck demonstrated no ~M decrease in suppleness [Thyroid Normal] : the thyroid ~T showed no abnormalities [Symmetrical] : the neck was ~L symmetrical [Warm and Dry] : was warm and dry to touch [Turgor Normal] : skin turgor ~T was normal [Normal Gait] : gait was normal [No Joint Swelling] : there was no joint swelling [No Clubbing, Cyanosis] : no clubbing or cyanosis of the fingernails [Normal Strength/Tone] : muscle strength and tone were normal [Vulvar Atrophy] : vulvar atrophy [Normal Appearance] : general appearance was normal [Atrophy] : atrophy [4] : 4 [C ____] : C [unfilled] [GH ____] : GH [unfilled] [PB ____] : PB [unfilled] [TVL ____] : TVL  [unfilled] [Ap ____] : Ap [unfilled] [Bp ____] : Bp [unfilled] [D ____] : D [unfilled] [Normal rectal exam] : was normal [None] : no [Examination Of The Breasts] : a normal appearance [No Discharge] : no discharge [No Masses] : no breast masses were palpable [Labia Minora] : normal [Labia Majora] : normal [Cystocele] : a cystocele [Normal] : normal [No Tenderness] : no tenderness [Uterine Adnexae] : normal [Nl Sphincter Tone] : normal sphincter tone [FreeTextEntry1] : nati [Mass] : no breast mass [Tender] : no tenderness [Nipple Discharge] : no nipple discharge [Mass (___ Cm)] : no ~M [unfilled] abdominal mass was palpated [Tenderness] : ~T no ~M abdominal tenderness observed [H/Smegaly] : no hepatosplenomegaly [Supraclavicular LAD] : no adenopathy noted in supraclavicular lymph nodes [Axillary LAD] : no adenopathy was noted in axillary nodes [Inguinal LAD] : no adenopathy was noted in the inguinal lymph nodes [Rash/Lesion] : no rash or lesion was noted [Estrogen Effect] : no estrogen effect was observed [Aa ____] : Aa [unfilled] [Ba ____] : Ba [unfilled] [] : I [FreeTextEntry4] : grade 1 cystocele, no prolapse

## 2022-04-03 NOTE — COUNSELING
[FreeTextEntry1] : Alysha 65-year-old para 4  4 with predominantly urge incontinence, small cystocele and generalized slight pelvic floor weakness and occasional stress incontinence.  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  She chooses physical therapy for now at [facility] and will see me back again in 6 to 8 months to assess her progress and other therapies will be added as necessary\par \par Renal ultrasound was ordered to rule out structural issues including hydronephrosis, urine analysis culture and cytology was sent, postvoid residual is normal, and voiding diary was given.  Also literature provided and 3D and computer modeling utilized to demonstrate the patient's situation to her-normal pelvic and renal sono \par \par Her gynecologist is Dr. Trudy Humphrey however there was a discharge present today and some cervical bleeding predominantly in all likelihood due to atrophy therefore Pap and affirm with HPV was taken and I will report this back to Dr. Huertas and to the patient-neg pap/hpv  affirm was + and treated, veronique today 9/10/21 with affirm and ua uc; pvr nl\par \par Pelvic sonogram was done and revealed a 4.5 mm probable polyp as she has had polypectomy D&C x2 all benign in the past and she is aware that if she has any staining or bleeding she is to report this to me and/or Dr. Huertas and appropriate measures will be taken including investigation; some adnexal tenderness will re-order ta tv pelvic sono as ovaries were not seen at last sono and adnexal exam although nl with me and Dr. Gardienr, some gas present and difficult to get deep in to adnexae. also fh+M ov ca pm-NORMAL PELVIC AND RENAL SONO AS ABOVE RPT AS CLINICALLY INDICATED\par \par The patient has had the opportunity to ask questions which have been answered to her apparent satisfaction\par JRABIN\par \par Patient comes in today for test of cure for bacterial vaginosis admitting that she only used the medication for 1 night and I will repeat the test of cure today and it is pending if it still positive she will complete the course another 4 nights and a test of cure sometime in September or October and a follow-up in January for her biannual visit, she is going to continue to try physical therapy and make an appointment for formal physical therapy at [facility] physical therapy for her incontinence and pelvic organ prolapse, additionally if her test of cure is negative I will just monitor her again for that in .  All questions answered to the patient's apparent satisfaction\par JMR\par \par April 3, 2022\par Patient presents for follow-up visit having completed her first course with physical therapy at [facility] physical therapy and I have also suggested some electrical stimulation transvaginally due to the urge component-she wakes up on the average of twice per night however she does not go to sleep until 2 AM\par Due to family stress particularly with her to affected children she has been gaining weight eating carbohydrates more than she would like and not exercising.  She shared this information today and said she is her own support when I inquired as to whether or not she would like a referral for behavioral health to help her with her family stress she declines at present\par \par Her physical exam is improved and that her pelvic organ prolapse is now mild to moderate rather than moderate in the central cystocele is somewhat improved see POP Q scores for details-however it is clear that the wait the extra weight is pressing on her bladder and further weakening the pelvic floor and connective tissue-\par -the cause of her leakage is basically urethral detrusor facilitative reflex with the urge component neuromuscular and muscular fascial tearing leading to the pelvic organ prolapse which contributes to the stress component-again this is due to loop 2 efferent stim and parasympathetic stimulation with a funneled bladder neck\par \par Treatment options include continued physical therapy which she is going to be continuing, weight loss and dietary adjustment.  This also includes doing nothing pessary medications or surgery and she chooses continued physical therapy and possibly posterior tibial nerve stimulation as well as weight loss and exercise\par \par She is now going to be seeing me for GYN care and her annual visit with me today was normal normal breast and pelvic exam except for the prolapse.\par \par Urine analysis culture and cytology was sent and her postvoid residual is 3 cc\par Mammogram was ordered for May\par Pelvic and renal sono for September these were normal last year\par Bone density was normal last year will be repeated  or \par She states her vitamin D level was low and she is now on 3000 international units a day with her internist Dr. Wendy Quan\par Vaccines were reviewed with the patient and she will review this with Dr. Quan to see which vaccines she is due for including a second Covid booster and possibly Tdap and pneumonia as well as shingles\par Her colonoscopy may be due when she will check with her gastroenterologist she had polyps approximately 3 years ago at the last colonoscopy\par Her eye exam is up-to-date\par Again she declines behavioral therapy\par \par Appointment was made for her to see me in 2022 all questions were answered to the patient's apparent satisfaction\par

## 2022-04-03 NOTE — HISTORY OF PRESENT ILLNESS
[Cystocele (Obstetric)] : no [Uterine Prolapse] : no [Vaginal Wall Prolapse] : no [Rectal Prolapse] : no [Stress Incontinence] : no [Urge Incontinence Of Urine] : no [Unable To Restrain Bowel Movement] : moderate [Urinary Stream Starts And Stops] : no [x2] : nocturia two times a night [Incomplete Emptying Of Bladder] : no [Urinary Frequency] : no [Feelings Of Urinary Urgency] : no [Pain During Urination (Dysuria)] : no [Urinary Tract Infection] : no [Hematuria] : no [Constipation Obstructed Defecation] : no [Incomplete Emptying Of Stool] : no [Stool Visible Blood] : no [Diarrhea] : no [Pelvic Pain] : no [Vaginal Pain] : no [Vulvar Pain] : no [Bladder Pain] : no [Rectal Pain] : no [Back Pain] : no [Sexual Dysfunction, NOS] : no [LMP unknown] : LMP unknown [postmenopausal] : postmenopausal [N] : Patient is not sexually active [Y] : Positive pregnancy history [] : no [FreeTextEntry1] : Alysha 65-year-old para 4  4 with complaints of urgency frequency, double voiding and occasional urge incontinence with nocturia 2-3 times per night.  Occasionally with a full bladder she does have some stress incontinence symptoms and no bulging or vaginal or pelvic pressure symptoms per se.  She is referred from gynecologist Dr. Trudy Huertas and read recent mammogram was normal pelvic ultrasound reveals a 4.5 mm probable endometrial polyp\par She has had 2 D&Cs because of polyps and bleeding however she has not had bleeding in quite some time. [Mammogramdate] : 12/19 [PGHxTotal] : 4 [PapSmeardate] : 12/19 [[facility] [[facility]] : 4

## 2022-04-04 LAB
APPEARANCE: CLEAR
BACTERIA: NEGATIVE
BILIRUBIN URINE: NEGATIVE
BLOOD URINE: NEGATIVE
COLOR: NORMAL
GLUCOSE QUALITATIVE U: NEGATIVE
HYALINE CASTS: 0 /LPF
KETONES URINE: NEGATIVE
LEUKOCYTE ESTERASE URINE: NEGATIVE
MICROSCOPIC-UA: NORMAL
NITRITE URINE: NEGATIVE
PH URINE: 7
PROTEIN URINE: NORMAL
RED BLOOD CELLS URINE: 2 /HPF
SPECIFIC GRAVITY URINE: 1.02
SQUAMOUS EPITHELIAL CELLS: 1 /HPF
UROBILINOGEN URINE: NORMAL
WHITE BLOOD CELLS URINE: 0 /HPF

## 2022-04-05 LAB — BACTERIA UR CULT: NORMAL

## 2022-04-07 LAB — URINE CYTOLOGY: NORMAL

## 2022-04-13 ENCOUNTER — TRANSCRIPTION ENCOUNTER (OUTPATIENT)
Age: 67
End: 2022-04-13

## 2022-04-21 ENCOUNTER — APPOINTMENT (OUTPATIENT)
Dept: OTOLARYNGOLOGY | Facility: CLINIC | Age: 67
End: 2022-04-21
Payer: MEDICARE

## 2022-04-21 VITALS
SYSTOLIC BLOOD PRESSURE: 139 MMHG | WEIGHT: 159 LBS | BODY MASS INDEX: 27.14 KG/M2 | HEART RATE: 98 BPM | DIASTOLIC BLOOD PRESSURE: 74 MMHG | HEIGHT: 64 IN

## 2022-04-21 DIAGNOSIS — H93.8X1 OTHER SPECIFIED DISORDERS OF RIGHT EAR: ICD-10-CM

## 2022-04-21 PROCEDURE — 99212 OFFICE O/P EST SF 10 MIN: CPT

## 2022-04-21 PROCEDURE — 92504 EAR MICROSCOPY EXAMINATION: CPT

## 2022-04-21 RX ORDER — POLYMYXIN B SULFATE AND TRIMETHOPRIM 10000; 1 [USP'U]/ML; MG/ML
10000-0.1 SOLUTION OPHTHALMIC
Qty: 10 | Refills: 0 | Status: DISCONTINUED | COMMUNITY
Start: 2021-12-14 | End: 2022-04-21

## 2022-04-21 RX ORDER — ZOLPIDEM TARTRATE 5 MG/1
5 TABLET ORAL
Qty: 20 | Refills: 0 | Status: DISCONTINUED | COMMUNITY
Start: 2021-01-06 | End: 2022-04-21

## 2022-04-21 RX ORDER — FAMOTIDINE 20 MG/1
20 TABLET, FILM COATED ORAL
Qty: 30 | Refills: 0 | Status: DISCONTINUED | COMMUNITY
Start: 2021-01-06 | End: 2022-04-21

## 2022-04-21 RX ORDER — MUPIROCIN 20 MG/G
2 OINTMENT TOPICAL
Qty: 22 | Refills: 0 | Status: DISCONTINUED | COMMUNITY
Start: 2022-03-29 | End: 2022-04-21

## 2022-04-21 RX ORDER — SULFAMETHOXAZOLE AND TRIMETHOPRIM 800; 160 MG/1; MG/1
800-160 TABLET ORAL
Qty: 14 | Refills: 0 | Status: DISCONTINUED | COMMUNITY
Start: 2022-03-31 | End: 2022-04-21

## 2022-04-21 RX ORDER — CEPHALEXIN 500 MG/1
500 CAPSULE ORAL
Qty: 20 | Refills: 0 | Status: DISCONTINUED | COMMUNITY
Start: 2021-07-05 | End: 2022-04-21

## 2022-04-21 RX ORDER — CHROMIUM 200 MCG
TABLET ORAL
Refills: 0 | Status: ACTIVE | COMMUNITY

## 2022-04-21 RX ORDER — PREDNISONE 20 MG/1
20 TABLET ORAL
Qty: 7 | Refills: 0 | Status: DISCONTINUED | COMMUNITY
Start: 2022-03-30 | End: 2022-04-21

## 2022-04-21 NOTE — REASON FOR VISIT
[Subsequent Evaluation] : a subsequent evaluation for [FreeTextEntry2] : bilateral high frequency SNHL

## 2022-04-21 NOTE — PHYSICAL EXAM
[Binocular Microscopic Exam] : Binocular microscopic exam was performed [Rinne Test Air Conduction Persists > Bone Conduction Right] : air conduction greater than bone conduction on the right [Rinne Test Air Conduction Persists > Bone Conduction Left] : air conduction greater than bone conduction on the left [Hearing Lucas Test (Tuning Fork On Forehead)] : no lateralization of tone [Normal] : no rashes [Hearing Loss Right Only] : normal [Hearing Loss Left Only] : normal [Nystagmus] : ~T no ~M nystagmus was seen [Fukuda Step Test] : Fukuda Step Test was Negative [Romberg's Sign] : Romberg's sign was absent [Fistula Sign] : Fistula Sign: Negative [Past-Pointing] : Past-Pointing: Negative [Sandra-Hallchioke] : Meridian-Hallpike: Negative

## 2022-04-21 NOTE — PROCEDURE
[Other ___] : [unfilled] [Same] : same as the Pre Op Dx. [] : Binocular Microscopy [FreeTextEntry1] : bilateral tinnitus [FreeTextEntry4] : none [FreeTextEntry6] : Operative microscope was used to examine the ear canal, ear drum and visible middle landmarks.  Adequate exam would not have been possible without the use of a microscope.  Findings are described.

## 2022-04-21 NOTE — HISTORY OF PRESENT ILLNESS
[de-identified] : 65 year old  annual follow up for bilateral high frequency SNHL\par History of bilateral tinnitus\par Reports occasional tinnitus in right ear-unsure of left ear. \par Reports using ear buds and feeling like liquid moving in the ear \par Reports hearing is stable.\par Patient denies otalgia, otorrhea, ear infections, dizziness, vertigo, headaches related to hearing.

## 2022-04-21 NOTE — CONSULT LETTER
[Sincerely,] : Sincerely, [FreeTextEntry3] : Gonzalo Roland MD, Otology Neurotology & Skull Base Surgery

## 2022-05-19 NOTE — ASU PREOP CHECKLIST - BP NONINVASIVE SYSTOLIC (MM HG)
[Mother] : mother [Toothpaste] : Primary Fluoride Source: Toothpaste [Normal] : normal [LMP: _____] : LMP: [unfilled] [Age of Menarche: ____] : Age of Menarche: [unfilled] [Eats meals with family] : eats meals with family [Has family members/adults to turn to for help] : has family members/adults to turn to for help 137 [Is permitted and is able to make independent decisions] : Is permitted and is able to make independent decisions [Sleep Concerns] : sleep concerns [Grade: ____] : Grade: [unfilled] [Normal Performance] : normal performance [Normal Behavior/Attention] : normal behavior/attention [Normal Homework] : normal homework [Eats regular meals including adequate fruits and vegetables] : eats regular meals including adequate fruits and vegetables [Drinks non-sweetened liquids] : drinks non-sweetened liquids  [Calcium source] : calcium source [Has friends] : has friends [At least 1 hour of physical activity a day] : at least 1 hour of physical activity a day [Has interests/participates in community activities/volunteers] : has interests/participates in community activities/volunteers. [Uses safety belts/safety equipment] : uses safety belts/safety equipment  [Has peer relationships free of violence] : has peer relationships free of violence [No] : Patient has not had sexual intercourse [HIV Screening Declined] : HIV Screening Declined [Has ways to cope with stress] : has ways to cope with stress [Displays self-confidence] : displays self-confidence [Up to date] : Up to date [Gets depressed, anxious, or irritable/has mood swings] : gets depressed, anxious, or irritable/has mood swings [Irregular menses] : no irregular menses [Heavy Bleeding] : no heavy bleeding [Painful Cramps] : no painful cramps [Has concerns about body or appearance] : does not have concerns about body or appearance [Screen time (except homework) less than 2 hours a day] : no screen time (except homework) less than 2 hours a day [Uses electronic nicotine delivery system] : does not use electronic nicotine delivery system [Uses tobacco] : does not use tobacco [Uses drugs] : does not use drugs  [Drinks alcohol] : does not drink alcohol [Impaired/distracted driving] : no impaired/distracted driving [Has problems with sleep] : does not have problems with sleep [Has thought about hurting self or considered suicide] : has not thought about hurting self or considered suicide [de-identified] : Mom w/ concerns about personal hygiene, difficult to get her to bathe, hair/ body get "gross"- tricky to get her in shower ever 1.5 weeks.  Per patient does not get point of bathing, doesn't think necessary.  Mom also w/ concerns abou thick scars under arm-pits.  [de-identified] : anxiety  [de-identified] : [employer]- smaller class [de-identified] : In care of psychiatrist and therapist, states in good place currently, no SI/HI.

## 2022-10-31 ENCOUNTER — NON-APPOINTMENT (OUTPATIENT)
Age: 67
End: 2022-10-31

## 2022-11-05 PROBLEM — N95.2 POSTMENOPAUSAL ATROPHIC VAGINITIS: Status: ACTIVE | Noted: 2021-07-26

## 2022-11-07 ENCOUNTER — APPOINTMENT (OUTPATIENT)
Dept: OBGYN | Facility: CLINIC | Age: 67
End: 2022-11-07

## 2022-11-07 VITALS
BODY MASS INDEX: 26.12 KG/M2 | HEIGHT: 64 IN | DIASTOLIC BLOOD PRESSURE: 74 MMHG | WEIGHT: 153 LBS | HEART RATE: 99 BPM | SYSTOLIC BLOOD PRESSURE: 150 MMHG

## 2022-11-07 DIAGNOSIS — N95.2 POSTMENOPAUSAL ATROPHIC VAGINITIS: ICD-10-CM

## 2022-11-07 DIAGNOSIS — R32 UNSPECIFIED URINARY INCONTINENCE: ICD-10-CM

## 2022-11-07 PROCEDURE — 99214 OFFICE O/P EST MOD 30 MIN: CPT

## 2022-11-07 NOTE — COUNSELING
[FreeTextEntry1] : Alysha 65-year-old para 4  4 with predominantly urge incontinence, small cystocele and generalized slight pelvic floor weakness and occasional stress incontinence.  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  She chooses physical therapy for now at [facility] and will see me back again in 6 to 8 months to assess her progress and other therapies will be added as necessary\par \par Renal ultrasound was ordered to rule out structural issues including hydronephrosis, urine analysis culture and cytology was sent, postvoid residual is normal, and voiding diary was given.  Also literature provided and 3D and computer modeling utilized to demonstrate the patient's situation to her-normal pelvic and renal sono \par \par Her gynecologist is Dr. Trudy Humphrey however there was a discharge present today and some cervical bleeding predominantly in all likelihood due to atrophy therefore Pap and affirm with HPV was taken and I will report this back to Dr. Huertas and to the patient-neg pap/hpv  affirm was + and treated, veronique today 9/10/21 with affirm and ua uc; pvr nl\par \par Pelvic sonogram was done and revealed a 4.5 mm probable polyp as she has had polypectomy D&C x2 all benign in the past and she is aware that if she has any staining or bleeding she is to report this to me and/or Dr. Huertas and appropriate measures will be taken including investigation; some adnexal tenderness will re-order ta tv pelvic sono as ovaries were not seen at last sono and adnexal exam although nl with me and Dr. Gardiner, some gas present and difficult to get deep in to adnexae. also fh+M ov ca pm-NORMAL PELVIC AND RENAL SONO AS ABOVE RPT AS CLINICALLY INDICATED\par \par The patient has had the opportunity to ask questions which have been answered to her apparent satisfaction\par JRABIN\par \par Patient comes in today for test of cure for bacterial vaginosis admitting that she only used the medication for 1 night and I will repeat the test of cure today and it is pending if it still positive she will complete the course another 4 nights and a test of cure sometime in September or October and a follow-up in January for her biannual visit, she is going to continue to try physical therapy and make an appointment for formal physical therapy at [facility] physical therapy for her incontinence and pelvic organ prolapse, additionally if her test of cure is negative I will just monitor her again for that in .  All questions answered to the patient's apparent satisfaction\par JMR\par \par 2022\par Patient presents for follow-up visit having completed her first course with physical therapy at [facility] physical therapy and I have also suggested some electrical stimulation transvaginally due to the urge component-she wakes up on the average of twice per night however she does not go to sleep until 2 AM\par Due to family stress particularly with her to affected children she has been gaining weight eating carbohydrates more than she would like and not exercising.  She shared this information today and said she is her own support when I inquired as to whether or not she would like a referral for behavioral health to help her with her family stress she declines at present-we went over treatment options again today and she will go back to physical therapy repeat prescription has been given to the patient and she will try to go back and take some time out for herself and do physical therapy the other treatments would be pessary or surgery and she will try physical therapy at [facility] physical therapy first all of the contact information has been given to her\par \par Her physical exam is improved and that her pelvic organ prolapse is now mild to moderate rather than moderate in the central cystocele is somewhat improved see POP Q scores for details-however it is clear that the wait the extra weight is pressing on her bladder and further weakening the pelvic floor and connective tissue-\par -the cause of her leakage is basically urethral detrusor facilitative reflex with the urge component neuromuscular and muscular fascial tearing leading to the pelvic organ prolapse which contributes to the stress component-again this is due to loop 2 efferent stim and parasympathetic stimulation with a funneled bladder neck\par \par Treatment options include continued physical therapy which she is going to be continuing, weight loss and dietary adjustment.  This also includes doing nothing pessary medications or surgery and she chooses continued physical therapy and possibly posterior tibial nerve stimulation as well as weight loss and exercise\par \par She is now going to be seeing me for GYN care and her annual visit with me today was normal normal breast and pelvic exam except for the prolapse.\par \par Urine analysis culture and cytology was sent and her postvoid residual is 3 cc\par Mammogram-overdue  ordered\par Pelvic and renal sono for September these were normal last year-overdue \par Bone density was normal last year will be repeated  or \par She states her vitamin D level was low and she is now on 3000 international units a day with her internist Dr. Wendy Quan-she asked for referral for [facility] PCP which is in progress\par Vaccines were reviewed with the patient and she will review this with Dr. Quan to see which vaccines she is due for including a second Covid booster and possibly Tdap and pneumonia as well as shingles\par Her colonoscopy may be due when she will check with her gastroenterologist she had polyps approximately 3-4 years ago at the last colonoscopy I am referring her to Dr. Xiomara Herrera for follow-up colonoscopy\par Her eye exam is up-to-date\par Affirm and Pap and HPV were taken because of cervicitis-\par Urine analysis culture and cytology sent\par \par Appointment was made for her to see me in spring 2023 all questions were answered to the patient's apparent satisfaction\par

## 2022-11-07 NOTE — PHYSICAL EXAM
[Chaperone Present] : A chaperone was present in the examining room during all aspects of the physical examination [No Acute Distress] : in no acute distress [Well developed] : well developed [Well Nourished] : ~L well nourished [Good Hygeine] : demonstrates good hygeine [Oriented x3] : oriented to person, place, and time [Normal Memory] : ~T memory was ~L unimpaired [Normal Mood/Affect] : mood and affect are normal [Normal Lung Sounds] : the lungs were clear to auscultation [Respirations regular] : ~T respiratory rate was regular [Rate & Rhythm Regular] : ~T heart rate and rhythm were normal [No Edema] : ~T edema was not present [Supple] : ~T the neck demonstrated no ~M decrease in suppleness [Thyroid Normal] : the thyroid ~T showed no abnormalities [Symmetrical] : the neck was ~L symmetrical [Warm and Dry] : was warm and dry to touch [Turgor Normal] : skin turgor ~T was normal [Normal Gait] : gait was normal [No Joint Swelling] : there was no joint swelling [No Clubbing, Cyanosis] : no clubbing or cyanosis of the fingernails [Normal Strength/Tone] : muscle strength and tone were normal [Vulvar Atrophy] : vulvar atrophy [Normal Appearance] : general appearance was normal [Atrophy] : atrophy [4] : 4 [Aa ____] : Aa [unfilled] [Ba ____] : Ba [unfilled] [C ____] : C [unfilled] [GH ____] : GH [unfilled] [PB ____] : PB [unfilled] [TVL ____] : TVL  [unfilled] [Ap ____] : Ap [unfilled] [Bp ____] : Bp [unfilled] [D ____] : D [unfilled] [] : I [Normal rectal exam] : was normal [None] : no [Examination Of The Breasts] : a normal appearance [No Discharge] : no discharge [No Masses] : no breast masses were palpable [Labia Majora] : normal [Labia Minora] : normal [Cystocele] : a cystocele [Normal] : normal [Uterine Adnexae] : normal [No Tenderness] : no tenderness [Nl Sphincter Tone] : normal sphincter tone [FreeTextEntry1] : della [Mass] : no breast mass [Tender] : no tenderness [Nipple Discharge] : no nipple discharge [Mass (___ Cm)] : no ~M [unfilled] abdominal mass was palpated [Tenderness] : ~T no ~M abdominal tenderness observed [H/Smegaly] : no hepatosplenomegaly [Supraclavicular LAD] : no adenopathy noted in supraclavicular lymph nodes [Axillary LAD] : no adenopathy was noted in axillary nodes [Inguinal LAD] : no adenopathy was noted in the inguinal lymph nodes [Rash/Lesion] : no rash or lesion was noted [Estrogen Effect] : no estrogen effect was observed [FreeTextEntry4] : grade 1 cystocele, no prolapse

## 2022-11-07 NOTE — HISTORY OF PRESENT ILLNESS
[Cystocele (Obstetric)] : no [Uterine Prolapse] : no [Vaginal Wall Prolapse] : no [Rectal Prolapse] : no [Stress Incontinence] : no [Urge Incontinence Of Urine] : no [Unable To Restrain Bowel Movement] : moderate [x2] : nocturia two times a night [Urinary Stream Starts And Stops] : no [Incomplete Emptying Of Bladder] : no [Urinary Frequency] : no [Feelings Of Urinary Urgency] : no [Pain During Urination (Dysuria)] : no [Urinary Tract Infection] : no [Hematuria] : no [Constipation Obstructed Defecation] : no [Incomplete Emptying Of Stool] : no [Stool Visible Blood] : no [Diarrhea] : no [Pelvic Pain] : no [Vaginal Pain] : no [Vulvar Pain] : no [Bladder Pain] : no [Rectal Pain] : no [Back Pain] : no [Sexual Dysfunction, NOS] : no [LMP unknown] : LMP unknown [postmenopausal] : postmenopausal [N] : Patient is not sexually active [Y] : Positive pregnancy history [] : no [FreeTextEntry1] : Alysha 65-year-old para 4  4 with complaints of urgency frequency, double voiding and occasional urge incontinence with nocturia 2-3 times per night.  Occasionally with a full bladder she does have some stress incontinence symptoms and no bulging or vaginal or pelvic pressure symptoms per se.  She is referred from gynecologist Dr. Trudy Huertas and read recent mammogram was normal pelvic ultrasound reveals a 4.5 mm probable endometrial polyp\par She has had 2 D&Cs because of polyps and bleeding however she has not had bleeding in quite some time. [Mammogramdate] : 12/19 [PapSmeardate] : 12/19 [PGHxTotal] : 4 [[facility] [[facility]] : 4

## 2022-11-08 LAB
APPEARANCE: CLEAR
BACTERIA UR CULT: NORMAL
BACTERIA: NEGATIVE
BILIRUBIN URINE: NEGATIVE
BLOOD URINE: NEGATIVE
COLOR: COLORLESS
GLUCOSE QUALITATIVE U: NEGATIVE
HPV HIGH+LOW RISK DNA PNL CVX: NOT DETECTED
HYALINE CASTS: 0 /LPF
KETONES URINE: NEGATIVE
LEUKOCYTE ESTERASE URINE: NEGATIVE
MICROSCOPIC-UA: NORMAL
NITRITE URINE: NEGATIVE
PH URINE: 6
PROTEIN URINE: NEGATIVE
RED BLOOD CELLS URINE: 0 /HPF
SPECIFIC GRAVITY URINE: 1.01
SQUAMOUS EPITHELIAL CELLS: 0 /HPF
URINE CYTOLOGY: NORMAL
UROBILINOGEN URINE: NORMAL
WHITE BLOOD CELLS URINE: 0 /HPF

## 2022-11-09 DIAGNOSIS — B96.89 ACUTE VAGINITIS: ICD-10-CM

## 2022-11-09 DIAGNOSIS — N76.0 ACUTE VAGINITIS: ICD-10-CM

## 2022-11-09 RX ORDER — METRONIDAZOLE 7.5 MG/G
0.75 GEL VAGINAL
Qty: 1 | Refills: 0 | Status: ACTIVE | COMMUNITY
Start: 2022-11-09 | End: 1900-01-01

## 2022-11-12 LAB — CYTOLOGY CVX/VAG DOC THIN PREP: NORMAL

## 2022-11-14 ENCOUNTER — NON-APPOINTMENT (OUTPATIENT)
Age: 67
End: 2022-11-14

## 2022-11-17 ENCOUNTER — NON-APPOINTMENT (OUTPATIENT)
Age: 67
End: 2022-11-17

## 2022-12-05 ENCOUNTER — NON-APPOINTMENT (OUTPATIENT)
Age: 67
End: 2022-12-05

## 2022-12-12 ENCOUNTER — APPOINTMENT (OUTPATIENT)
Dept: OBGYN | Facility: CLINIC | Age: 67
End: 2022-12-12

## 2022-12-12 ENCOUNTER — NON-APPOINTMENT (OUTPATIENT)
Age: 67
End: 2022-12-12

## 2022-12-12 VITALS
HEIGHT: 64 IN | WEIGHT: 156 LBS | HEART RATE: 99 BPM | BODY MASS INDEX: 26.63 KG/M2 | SYSTOLIC BLOOD PRESSURE: 154 MMHG | DIASTOLIC BLOOD PRESSURE: 81 MMHG

## 2022-12-12 VITALS
BODY MASS INDEX: 26.12 KG/M2 | HEART RATE: 73 BPM | HEIGHT: 64 IN | WEIGHT: 153 LBS | DIASTOLIC BLOOD PRESSURE: 84 MMHG | SYSTOLIC BLOOD PRESSURE: 141 MMHG

## 2022-12-12 DIAGNOSIS — N76.0 ACUTE VAGINITIS: ICD-10-CM

## 2022-12-12 DIAGNOSIS — R82.71 BACTERIURIA: ICD-10-CM

## 2022-12-12 DIAGNOSIS — B96.89 ACUTE VAGINITIS: ICD-10-CM

## 2022-12-12 PROCEDURE — 99213 OFFICE O/P EST LOW 20 MIN: CPT

## 2022-12-12 NOTE — COUNSELING
[FreeTextEntry1] : Alysha 65-year-old para 4  4 with predominantly urge incontinence, small cystocele and generalized slight pelvic floor weakness and occasional stress incontinence.  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  She chooses physical therapy for now at [facility] and will see me back again in 6 to 8 months to assess her progress and other therapies will be added as necessary\par \par Renal ultrasound was ordered to rule out structural issues including hydronephrosis, urine analysis culture and cytology was sent, postvoid residual is normal, and voiding diary was given.  Also literature provided and 3D and computer modeling utilized to demonstrate the patient's situation to her-normal pelvic and renal sono \par \par Her gynecologist is Dr. Trudy Humphrey however there was a discharge present today and some cervical bleeding predominantly in all likelihood due to atrophy therefore Pap and affirm with HPV was taken and I will report this back to Dr. Huertas and to the patient-neg pap/hpv  affirm was + and treated, veronique today 9/10/21 with affirm and ua uc; pvr nl\par \par Pelvic sonogram was done and revealed a 4.5 mm probable polyp as she has had polypectomy D&C x2 all benign in the past and she is aware that if she has any staining or bleeding she is to report this to me and/or Dr. Huertas and appropriate measures will be taken including investigation; some adnexal tenderness will re-order ta tv pelvic sono as ovaries were not seen at last sono and adnexal exam although nl with me and Dr. Gardiner, some gas present and difficult to get deep in to adnexae. also fh+M ov ca pm-NORMAL PELVIC AND RENAL SONO AS ABOVE RPT AS CLINICALLY INDICATED\par \par The patient has had the opportunity to ask questions which have been answered to her apparent satisfaction\par JRABIN\par \par Patient comes in today for test of cure for bacterial vaginosis admitting that she only used the medication for 1 night and I will repeat the test of cure today and it is pending if it still positive she will complete the course another 4 nights and a test of cure sometime in September or October and a follow-up in January for her biannual visit, she is going to continue to try physical therapy and make an appointment for formal physical therapy at [facility] physical therapy for her incontinence and pelvic organ prolapse, additionally if her test of cure is negative I will just monitor her again for that in .  All questions answered to the patient's apparent satisfaction\par JMR\par \par Dec 12, 2022\par Patient presents for follow-up visit having completed her first course with physical therapy at [facility] physical therapy and I have also suggested some electrical stimulation transvaginally due to the urge component-she wakes up on the average of twice per night however she does not go to sleep until 2 AM\par Due to family stress particularly with her to affected children she has been gaining weight eating carbohydrates more than she would like and not exercising.  She shared this information today and said she is her own support when I inquired as to whether or not she would like a referral for behavioral health to help her with her family stress she declines at present-we went over treatment options again today and she will go back to physical therapy repeat prescription has been given to the patient and she will try to go back and take some time out for herself and do physical therapy the other treatments would be pessary or surgery and she will try physical therapy at [facility] physical therapy first all of the contact information has been given to her\par \par Her physical exam is improved and that her pelvic organ prolapse is now mild to moderate rather than moderate in the central cystocele is somewhat improved see POP Q scores for details-however it is clear that the wait the extra weight is pressing on her bladder and further weakening the pelvic floor and connective tissue-\par -the cause of her leakage is basically urethral detrusor facilitative reflex with the urge component neuromuscular and muscular fascial tearing leading to the pelvic organ prolapse which contributes to the stress component-again this is due to loop 2 efferent stim and parasympathetic stimulation with a funneled bladder neck\par \par Treatment options include continued physical therapy which she is going to be continuing, weight loss and dietary adjustment.  This also includes doing nothing pessary medications or surgery and she chooses continued physical therapy and possibly posterior tibial nerve stimulation as well as weight loss and exercise\par \par She is now going to be seeing me for GYN care and her annual visit with me today was normal normal breast and pelvic exam except for the prolapse.\par \par Urine analysis culture and cytology was sent and her postvoid residual is 3 cc 22_________\par Mammogram-overdue  ordered\par Pelvic and renal sono for September these were normal last year-overdue \par Bone density was normal last year will be repeated  or \par She states her vitamin D level was low and she is now on 3000 international units a day with her internist Dr. Wendy Quan-she asked for referral for [facility] which is in progress-referral made by [facility] at pt's request-when they contacted her lm x 2 then declined\par Vaccines were reviewed with the patient and she will review this with Dr. Quan to see which vaccines she is due for including a second Covid booster and possibly Tdap and pneumonia as well as shingles\par Her colonoscopy may be due when she will check with her gastroenterologist she had polyps approximately 3-4 years ago at the last colonoscopy I am referring her to Dr. Xiomara Herrera for follow-up colonoscopy\par Her eye exam is up-to-date\par Affirm and Pap and HPV were taken because of cervicitis-pap/hpv neg, rxd bv-veronique today: sent-rx to ameliorate freq of bv: veronique  22__________________\par Urine analysis culture and cytology sent\par \par Appointment was made for her to see me in spring 2023 all questions were answered to the patient's apparent satisfaction\par jmr\par

## 2022-12-12 NOTE — HISTORY OF PRESENT ILLNESS
[] : no [FreeTextEntry1] : Alysha 65-year-old para 4  4 with complaints of urgency frequency, double voiding and occasional urge incontinence with nocturia 2-3 times per night.  Occasionally with a full bladder she does have some stress incontinence symptoms and no bulging or vaginal or pelvic pressure symptoms per se.  She is referred from gynecologist Dr. Trudy Huertas and read recent mammogram was normal pelvic ultrasound reveals a 4.5 mm probable endometrial polyp\par She has had 2 D&Cs because of polyps and bleeding however she has not had bleeding in quite some time. [Mammogramdate] : 12/19 [PapSmeardate] : 12/19 [PGHxTotal] : 4 [[facility] : 4 [[facility]] : 4

## 2022-12-12 NOTE — PHYSICAL EXAM
[FreeTextEntry1] : nati [Mass] : no breast mass [Tender] : no tenderness [Nipple Discharge] : no nipple discharge [Mass (___ Cm)] : no ~M [unfilled] abdominal mass was palpated [Tenderness] : ~T no ~M abdominal tenderness observed [H/Smegaly] : no hepatosplenomegaly [Supraclavicular LAD] : no adenopathy noted in supraclavicular lymph nodes [Axillary LAD] : no adenopathy was noted in axillary nodes [Inguinal LAD] : no adenopathy was noted in the inguinal lymph nodes [Rash/Lesion] : no rash or lesion was noted [Estrogen Effect] : no estrogen effect was observed [FreeTextEntry4] : grade 1 cystocele, no prolapse

## 2022-12-13 ENCOUNTER — RESULT REVIEW (OUTPATIENT)
Age: 67
End: 2022-12-13

## 2022-12-13 ENCOUNTER — APPOINTMENT (OUTPATIENT)
Dept: ULTRASOUND IMAGING | Facility: IMAGING CENTER | Age: 67
End: 2022-12-13

## 2022-12-13 ENCOUNTER — APPOINTMENT (OUTPATIENT)
Dept: MAMMOGRAPHY | Facility: IMAGING CENTER | Age: 67
End: 2022-12-13

## 2022-12-13 ENCOUNTER — OUTPATIENT (OUTPATIENT)
Dept: OUTPATIENT SERVICES | Facility: HOSPITAL | Age: 67
LOS: 1 days | End: 2022-12-13
Payer: MEDICARE

## 2022-12-13 DIAGNOSIS — Z90.49 ACQUIRED ABSENCE OF OTHER SPECIFIED PARTS OF DIGESTIVE TRACT: Chronic | ICD-10-CM

## 2022-12-13 DIAGNOSIS — Z98.890 OTHER SPECIFIED POSTPROCEDURAL STATES: Chronic | ICD-10-CM

## 2022-12-13 DIAGNOSIS — N81.9 FEMALE GENITAL PROLAPSE, UNSPECIFIED: ICD-10-CM

## 2022-12-13 DIAGNOSIS — Z98.49 CATARACT EXTRACTION STATUS, UNSPECIFIED EYE: Chronic | ICD-10-CM

## 2022-12-13 DIAGNOSIS — Z98.89 OTHER SPECIFIED POSTPROCEDURAL STATES: Chronic | ICD-10-CM

## 2022-12-13 PROCEDURE — 77063 BREAST TOMOSYNTHESIS BI: CPT | Mod: 26

## 2022-12-13 PROCEDURE — 76775 US EXAM ABDO BACK WALL LIM: CPT | Mod: 26

## 2022-12-13 PROCEDURE — 76856 US EXAM PELVIC COMPLETE: CPT | Mod: 26

## 2022-12-13 PROCEDURE — 76830 TRANSVAGINAL US NON-OB: CPT | Mod: 26

## 2022-12-13 PROCEDURE — 76641 ULTRASOUND BREAST COMPLETE: CPT

## 2022-12-13 PROCEDURE — 77067 SCR MAMMO BI INCL CAD: CPT

## 2022-12-13 PROCEDURE — 77067 SCR MAMMO BI INCL CAD: CPT | Mod: 26

## 2022-12-13 PROCEDURE — 76641 ULTRASOUND BREAST COMPLETE: CPT | Mod: 26,50

## 2022-12-13 PROCEDURE — 76775 US EXAM ABDO BACK WALL LIM: CPT

## 2022-12-13 PROCEDURE — 76830 TRANSVAGINAL US NON-OB: CPT

## 2022-12-13 PROCEDURE — 77063 BREAST TOMOSYNTHESIS BI: CPT

## 2022-12-13 PROCEDURE — 76856 US EXAM PELVIC COMPLETE: CPT

## 2022-12-14 ENCOUNTER — NON-APPOINTMENT (OUTPATIENT)
Age: 67
End: 2022-12-14

## 2022-12-14 LAB
BACTERIA UR CULT: NORMAL
CANDIDA VAG CYTO: NOT DETECTED
G VAGINALIS+PREV SP MTYP VAG QL MICRO: NOT DETECTED
T VAGINALIS VAG QL WET PREP: NOT DETECTED
URINE CYTOLOGY: NORMAL

## 2022-12-16 ENCOUNTER — NON-APPOINTMENT (OUTPATIENT)
Age: 67
End: 2022-12-16

## 2022-12-20 ENCOUNTER — NON-APPOINTMENT (OUTPATIENT)
Age: 67
End: 2022-12-20

## 2023-01-16 NOTE — ASU DISCHARGE PLAN (ADULT/PEDIATRIC) - B. DRINK ALCOHOL, BEER, OR WINE
Statement Selected

## 2023-05-04 ENCOUNTER — APPOINTMENT (OUTPATIENT)
Dept: OTOLARYNGOLOGY | Facility: CLINIC | Age: 68
End: 2023-05-04
Payer: MEDICARE

## 2023-05-04 PROCEDURE — 92557 COMPREHENSIVE HEARING TEST: CPT

## 2023-05-04 PROCEDURE — 92567 TYMPANOMETRY: CPT

## 2023-05-04 PROCEDURE — 92504 EAR MICROSCOPY EXAMINATION: CPT

## 2023-05-04 PROCEDURE — 99213 OFFICE O/P EST LOW 20 MIN: CPT

## 2023-05-04 NOTE — DATA REVIEWED
[de-identified] : An audiogram was ordered and performed including tympanometry, pure tones and speech, for patient's complaint of hearing loss\par I have independently reviewed the patient's audiogram from today and my findings include  dawit HF SNHL

## 2023-05-04 NOTE — HISTORY OF PRESENT ILLNESS
[de-identified] : 67 year old  annual follow up for bilateral high frequency SNHL\par Reports occasional tinnitus, unsure of which ear. \par Reports using ear buds and feeling like liquid moving in the ear is the same since the last visit. \par Reports hearing is stable.\par Patient denies otalgia, otorrhea, ear infections, dizziness, vertigo, headaches related to hearing.

## 2023-05-04 NOTE — PHYSICAL EXAM
[Binocular Microscopic Exam] : Binocular microscopic exam was performed [Rinne Test Air Conduction Persists > Bone Conduction Right] : air conduction greater than bone conduction on the right [Rinne Test Air Conduction Persists > Bone Conduction Left] : air conduction greater than bone conduction on the left [Hearing Lucas Test (Tuning Fork On Forehead)] : no lateralization of tone [Normal] : gait was normal [Hearing Loss Right Only] : normal [Hearing Loss Left Only] : normal [Nystagmus] : ~T no ~M nystagmus was seen [Fukuda Step Test] : Fukuda Step Test was Negative [Romberg's Sign] : Romberg's sign was absent [Fistula Sign] : Fistula Sign: Negative [Past-Pointing] : Past-Pointing: Negative [Sandra-Hallchioke] : Hollywood-Hallpike: Negative

## 2023-05-10 ENCOUNTER — NON-APPOINTMENT (OUTPATIENT)
Age: 68
End: 2023-05-10

## 2023-05-10 ENCOUNTER — APPOINTMENT (OUTPATIENT)
Dept: GASTROENTEROLOGY | Facility: CLINIC | Age: 68
End: 2023-05-10
Payer: MEDICARE

## 2023-05-10 VITALS
HEIGHT: 64 IN | WEIGHT: 164 LBS | BODY MASS INDEX: 28 KG/M2 | TEMPERATURE: 96.7 F | DIASTOLIC BLOOD PRESSURE: 83 MMHG | SYSTOLIC BLOOD PRESSURE: 143 MMHG | OXYGEN SATURATION: 99 % | HEART RATE: 100 BPM

## 2023-05-10 DIAGNOSIS — Z12.11 ENCOUNTER FOR SCREENING FOR MALIGNANT NEOPLASM OF COLON: ICD-10-CM

## 2023-05-10 DIAGNOSIS — Z86.010 ENCOUNTER FOR SCREENING FOR MALIGNANT NEOPLASM OF COLON: ICD-10-CM

## 2023-05-10 PROCEDURE — 99203 OFFICE O/P NEW LOW 30 MIN: CPT

## 2023-05-10 RX ORDER — SODIUM PICOSULFATE, MAGNESIUM OXIDE, AND ANHYDROUS CITRIC ACID 10; 3.5; 12 MG/160ML; G/160ML; G/160ML
10-3.5-12 MG-GM LIQUID ORAL
Qty: 1 | Refills: 0 | Status: ACTIVE | COMMUNITY
Start: 2023-05-10 | End: 1900-01-01

## 2023-05-10 RX ORDER — POLYETHYLENE GLYCOL-3350 AND ELECTROLYTES 236; 6.74; 5.86; 2.97; 22.74 G/274.31G; G/274.31G; G/274.31G; G/274.31G; G/274.31G
236 POWDER, FOR SOLUTION ORAL
Qty: 1 | Refills: 0 | Status: ACTIVE | COMMUNITY
Start: 2023-05-10 | End: 1900-01-01

## 2023-05-10 NOTE — HISTORY OF PRESENT ILLNESS
[FreeTextEntry1] : This is a 67-year-old female with no significant past medical history presenting for surveillance colonoscopy evaluation.  She underwent her first colonoscopy in 2019 by Dr. Ciaran Price and was found to have 3 colon polyps.  2 were tubular adenomas.  One in the sigmoid measuring over 15 mm was a tubulovillous adenoma.  She denies family history of colon cancer or colon polyps.  She currently denies abdominal pain, nausea or vomiting.  She denies changes in bowel habits.  She denies rectal bleeding or melena.  She denies weight loss or anemia.  She denies family history of colon cancer or colon polyps.

## 2023-05-10 NOTE — PHYSICAL EXAM

## 2023-05-10 NOTE — ASSESSMENT
[FreeTextEntry1] : This is a 67-year-old female found to have high risk adenomatous colon polyps.  I recommend surveillance colonoscopy.  I explained to her the risks, alternatives and benefits to a colonoscopy.  Risk including but not limited to bleeding, perforation, infection and adverse medication reaction.  Multiple questions were answered.  She stated understanding.  I informed her that her first-degree relatives including children should be screened for colon cancer starting at age 40 given her high risk adenomatous colon polyps.

## 2023-05-26 ENCOUNTER — TRANSCRIPTION ENCOUNTER (OUTPATIENT)
Age: 68
End: 2023-05-26

## 2023-05-26 ENCOUNTER — OUTPATIENT (OUTPATIENT)
Dept: OUTPATIENT SERVICES | Facility: HOSPITAL | Age: 68
LOS: 1 days | End: 2023-05-26
Payer: MEDICARE

## 2023-05-26 ENCOUNTER — APPOINTMENT (OUTPATIENT)
Dept: GASTROENTEROLOGY | Facility: HOSPITAL | Age: 68
End: 2023-05-26

## 2023-05-26 ENCOUNTER — RESULT REVIEW (OUTPATIENT)
Age: 68
End: 2023-05-26

## 2023-05-26 VITALS
DIASTOLIC BLOOD PRESSURE: 78 MMHG | WEIGHT: 160.06 LBS | HEART RATE: 77 BPM | SYSTOLIC BLOOD PRESSURE: 170 MMHG | HEIGHT: 64 IN | OXYGEN SATURATION: 98 % | TEMPERATURE: 97 F | RESPIRATION RATE: 20 BRPM

## 2023-05-26 VITALS
SYSTOLIC BLOOD PRESSURE: 157 MMHG | RESPIRATION RATE: 20 BRPM | OXYGEN SATURATION: 100 % | DIASTOLIC BLOOD PRESSURE: 80 MMHG | HEART RATE: 72 BPM

## 2023-05-26 DIAGNOSIS — Z98.89 OTHER SPECIFIED POSTPROCEDURAL STATES: Chronic | ICD-10-CM

## 2023-05-26 DIAGNOSIS — Z90.49 ACQUIRED ABSENCE OF OTHER SPECIFIED PARTS OF DIGESTIVE TRACT: Chronic | ICD-10-CM

## 2023-05-26 DIAGNOSIS — Z98.890 OTHER SPECIFIED POSTPROCEDURAL STATES: Chronic | ICD-10-CM

## 2023-05-26 DIAGNOSIS — Z98.49 CATARACT EXTRACTION STATUS, UNSPECIFIED EYE: Chronic | ICD-10-CM

## 2023-05-26 DIAGNOSIS — Z12.11 ENCOUNTER FOR SCREENING FOR MALIGNANT NEOPLASM OF COLON: ICD-10-CM

## 2023-05-26 PROCEDURE — 88305 TISSUE EXAM BY PATHOLOGIST: CPT

## 2023-05-26 PROCEDURE — 45385 COLONOSCOPY W/LESION REMOVAL: CPT | Mod: PT

## 2023-05-26 PROCEDURE — 45385 COLONOSCOPY W/LESION REMOVAL: CPT | Mod: GC

## 2023-05-26 PROCEDURE — 88305 TISSUE EXAM BY PATHOLOGIST: CPT | Mod: 26

## 2023-05-26 DEVICE — NET RETRV ROT ROTH 2.5MMX230CM: Type: IMPLANTABLE DEVICE | Status: FUNCTIONAL

## 2023-05-26 RX ORDER — SODIUM CHLORIDE 9 MG/ML
500 INJECTION INTRAMUSCULAR; INTRAVENOUS; SUBCUTANEOUS
Refills: 0 | Status: COMPLETED | OUTPATIENT
Start: 2023-05-26 | End: 2023-05-26

## 2023-05-26 RX ADMIN — SODIUM CHLORIDE 30 MILLILITER(S): 9 INJECTION INTRAMUSCULAR; INTRAVENOUS; SUBCUTANEOUS at 11:10

## 2023-05-26 NOTE — PACU DISCHARGE NOTE - MENTAL STATUS: PATIENT PARTICIPATION
DM (diabetes mellitus)    Hernia    HLD (hyperlipidemia)    HTN (hypertension)    Kidney stone     Awake

## 2023-05-26 NOTE — ASU PATIENT PROFILE, ADULT - NSICDXPASTMEDICALHX_GEN_ALL_CORE_FT
PAST MEDICAL HISTORY:  Cataract of both eyes     Menopausal disorder     Polyp of corpus uteri     Stress incontinence     Urge incontinence of urine

## 2023-05-26 NOTE — PRE PROCEDURE NOTE - PRE PROCEDURE EVALUATION
Attending Physician:             Xiomara Herrera               Procedure: colonoscopy    Indication for Procedure: hx of adenomatous colon polyp  ________________________________________________________  PAST MEDICAL & SURGICAL HISTORY:  Menopausal disorder      Cataract of both eyes      Stress incontinence      Urge incontinence of urine      Polyp of corpus uteri      S/P cholecystectomy      S/P appendectomy      Status post cataract extraction  bilateral; 3 years ago      S/P dilation and curettage  2014        ALLERGIES:  No Known Allergies    HOME MEDICATIONS:    AICD/PPM: [ ] yes   [ ] no    PERTINENT LAB DATA:                      PHYSICAL EXAMINATION:    Height (cm): 162.6  Weight (kg): 72.6  BMI (kg/m2): 27.5  BSA (m2): 1.78T(C): 36.3  HR: 77  BP: 170/78  RR: 20  SpO2: 98%    Constitutional: NAD  HEENT: PERRLA, EOMI,    Neck:  No JVD  Respiratory: CTAB/L  Cardiovascular: S1 and S2  Gastrointestinal: BS+, soft, NT/ND  Extremities: No peripheral edema  Neurological: A/O x 3, no focal deficits  Psychiatric: Normal mood, normal affect  Skin: No rashes    ASA Class: I [ ]  II [ ]  III [ ]  IV [ ]    COMMENTS:    The patient is a suitable candidate for the planned procedure unless box checked [ ]  No, explain:

## 2023-06-07 ENCOUNTER — APPOINTMENT (OUTPATIENT)
Dept: DERMATOLOGY | Facility: CLINIC | Age: 68
End: 2023-06-07
Payer: MEDICARE

## 2023-06-07 DIAGNOSIS — L82.0 INFLAMED SEBORRHEIC KERATOSIS: ICD-10-CM

## 2023-06-07 DIAGNOSIS — L21.9 SEBORRHEIC DERMATITIS, UNSPECIFIED: ICD-10-CM

## 2023-06-07 DIAGNOSIS — R21 RASH AND OTHER NONSPECIFIC SKIN ERUPTION: ICD-10-CM

## 2023-06-07 PROCEDURE — 99204 OFFICE O/P NEW MOD 45 MIN: CPT | Mod: 25

## 2023-06-07 PROCEDURE — 17110 DESTRUCTION B9 LES UP TO 14: CPT

## 2023-06-07 RX ORDER — CLOBETASOL PROPIONATE 0.5 MG/ML
0.05 SOLUTION TOPICAL
Qty: 2 | Refills: 6 | Status: ACTIVE | COMMUNITY
Start: 2023-06-07 | End: 1900-01-01

## 2023-06-07 RX ORDER — FLUCONAZOLE 200 MG/1
200 TABLET ORAL
Qty: 4 | Refills: 4 | Status: ACTIVE | COMMUNITY
Start: 2023-06-07 | End: 1900-01-01

## 2023-06-07 NOTE — PHYSICAL EXAM
[FreeTextEntry3] : AAOx3, pleasant, NAD, no visual lymphadenopathy\par hair, scalp, face, nose, eyelids, ears, lips, oropharynx, neck, chest, abdomen, back, right arm, left arm, nails, and hands examined with all normal findings,\par pertinent findings include:\par \par xerosis of hands\par + inflamed, waxy, keratotic papule x2 on right forearm\par Scaling patches located on face- brow, NLF, cheeks; perinasal\par

## 2023-06-07 NOTE — HISTORY OF PRESENT ILLNESS
[FreeTextEntry1] : rash on face and scalp [de-identified] : 67 year old female with rash on face and scalp. itchy. using various topicals.\par xerosis on hands.\par growing spot on right forearm.

## 2023-06-08 LAB — SURGICAL PATHOLOGY STUDY: SIGNIFICANT CHANGE UP

## 2023-06-12 ENCOUNTER — NON-APPOINTMENT (OUTPATIENT)
Age: 68
End: 2023-06-12

## 2023-06-12 ENCOUNTER — RESULT REVIEW (OUTPATIENT)
Age: 68
End: 2023-06-12

## 2023-06-12 ENCOUNTER — APPOINTMENT (OUTPATIENT)
Dept: OBGYN | Facility: CLINIC | Age: 68
End: 2023-06-12
Payer: MEDICARE

## 2023-06-12 VITALS
HEART RATE: 89 BPM | BODY MASS INDEX: 28 KG/M2 | SYSTOLIC BLOOD PRESSURE: 143 MMHG | HEIGHT: 64 IN | DIASTOLIC BLOOD PRESSURE: 87 MMHG | WEIGHT: 164 LBS

## 2023-06-12 DIAGNOSIS — R14.0 ABDOMINAL DISTENSION (GASEOUS): ICD-10-CM

## 2023-06-12 DIAGNOSIS — N39.3 STRESS INCONTINENCE (FEMALE) (MALE): ICD-10-CM

## 2023-06-12 DIAGNOSIS — N63.20 UNSPECIFIED LUMP IN THE LEFT BREAST, UNSPECIFIED QUADRANT: ICD-10-CM

## 2023-06-12 PROCEDURE — 99214 OFFICE O/P EST MOD 30 MIN: CPT

## 2023-06-12 NOTE — HISTORY OF PRESENT ILLNESS
[] : no [FreeTextEntry1] : Alysha 65-year-old para 4  4 with complaints of urgency frequency, double voiding and occasional urge incontinence with nocturia 2-3 times per night.  Occasionally with a full bladder she does have some stress incontinence symptoms and no bulging or vaginal or pelvic pressure symptoms per se.  She is referred from gynecologist Dr. Trudy Huertas and read recent mammogram was normal pelvic ultrasound reveals a 4.5 mm probable endometrial polyp\par She has had 2 D&Cs because of polyps and bleeding however she has not had bleeding in quite some time. [Mammogramdate] : 12/19 [PapSmeardate] : 12/19 [PGHxTotal] : 4 [[facility] : 4 [[facility]] : 4

## 2023-06-12 NOTE — PHYSICAL EXAM
[Breast Mass Left Breast ___cm] : no mass was palpable [___cm] : a ~M [unfilled] ~Ucm superior lateral quadrant mass was palpated [FreeTextEntry1] : ARCHANA [Mass] : no breast mass [Tender] : no tenderness [Nipple Discharge] : no nipple discharge [Mass (___ Cm)] : no ~M [unfilled] abdominal mass was palpated [Tenderness] : ~T no ~M abdominal tenderness observed [H/Smegaly] : no hepatosplenomegaly [Supraclavicular LAD] : no adenopathy noted in supraclavicular lymph nodes [Axillary LAD] : no adenopathy was noted in axillary nodes [Inguinal LAD] : no adenopathy was noted in the inguinal lymph nodes [Rash/Lesion] : no rash or lesion was noted [Estrogen Effect] : no estrogen effect was observed [FreeTextEntry4] : grade 1 cystocele, no prolapse

## 2023-06-12 NOTE — COUNSELING
[FreeTextEntry1] : Alysha 65-year-old para 4  4 with predominantly urge incontinence, small cystocele and generalized slight pelvic floor weakness and occasional stress incontinence.  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  She chooses physical therapy for now at [facility] and will see me back again in 6 to 8 months to assess her progress and other therapies will be added as necessary\par \par Renal ultrasound was ordered to rule out structural issues including hydronephrosis, urine analysis culture and cytology was sent, postvoid residual is normal, and voiding diary was given.  Also literature provided and 3D and computer modeling utilized to demonstrate the patient's situation to her-normal pelvic and renal sono \par \par Her gynecologist is Dr. Trudy Humphrey however there was a discharge present today and some cervical bleeding predominantly in all likelihood due to atrophy therefore Pap and affirm with HPV was taken and I will report this back to Dr. Huertas and to the patient-neg pap/hpv  affirm was + and treated, veronique today 9/10/21 with affirm and ua uc; pvr nl\par \par Pelvic sonogram was done and revealed a 4.5 mm probable polyp as she has had polypectomy D&C x2 all benign in the past and she is aware that if she has any staining or bleeding she is to report this to me and/or Dr. Huertas and appropriate measures will be taken including investigation; some adnexal tenderness will re-order ta tv pelvic sono as ovaries were not seen at last sono and adnexal exam although nl with me and Dr. Gardiner, some gas present and difficult to get deep in to adnexae. also fh+M ov ca pm-NORMAL PELVIC AND RENAL SONO AS ABOVE RPT AS CLINICALLY INDICATED\par \par The patient has had the opportunity to ask questions which have been answered to her apparent satisfaction\par JRABIN\par \par Patient comes in today for test of cure for bacterial vaginosis admitting that she only used the medication for 1 night and I will repeat the test of cure today and it is pending if it still positive she will complete the course another 4 nights and a test of cure sometime in September or October and a follow-up in January for her biannual visit, she is going to continue to try physical therapy and make an appointment for formal physical therapy at [facility] physical therapy for her incontinence and pelvic organ prolapse, additionally if her test of cure is negative I will just monitor her again for that in .  All questions answered to the patient's apparent satisfaction\par JMR\par \par 2023\par Patient presents for follow-up visit having completed her first course with physical therapy at [facility] physical therapy and I have also suggested some electrical stimulation transvaginally due to the urge component-she wakes up on the average of twice per night however she does not go to sleep until 2 AM\par Due to family stress particularly with her to affected children she has been gaining weight eating carbohydrates more than she would like and not exercising.  She shared this information today and said she is her own support when I inquired as to whether or not she would like a referral for behavioral health to help her with her family stress she declines at present-we went over treatment options again today and she will go back to physical therapy repeat prescription has been given to the patient and she will try to go back and take some time out for herself and do physical therapy the other treatments would be pessary or surgery and she will try physical therapy at [facility] physical therapy first all of the contact information has been given to her-WILL NOW RESUME PT-NEW RX GIVEN\par \par Her physical exam is improved and that her pelvic organ prolapse is now mild to moderate rather than moderate in the central cystocele is somewhat improved see POP Q scores for details-however it is clear that the wait the extra weight is pressing on her bladder and further weakening the pelvic floor and connective tissue-\par -the cause of her leakage is basically urethral detrusor facilitative reflex with the urge component neuromuscular and muscular fascial tearing leading to the pelvic organ prolapse which contributes to the stress component-again this is due to loop 2 efferent stim and parasympathetic stimulation with a funneled bladder neck\par \par Treatment options include continued physical therapy which she is going to be continuing, weight loss and dietary adjustment.  This also includes doing nothing pessary medications or surgery and she chooses continued physical therapy and possibly posterior tibial nerve stimulation as well as weight loss and exercise\par \par She is now going to be seeing me for GYN care and her annual visit with me today was normal normal breast and pelvic exam except for the prolapse.\par \par Urine analysis culture and cytology was sent and her postvoid residual is 3 cc 22_________\par Mammogram-HAVING THIS EARLY (JULY) DUE TO NEW CYSTIC AREA L AT 2 O'CLOCK 4 CM FA, 2 X 2 CM CYSTIC AREA NON-TENDER-MAMMO/SONO/VISIT TO [facility] SURGEON FARIBA LÓPEZ AFTER M/S\par Pelvic and renal sono for September WILL MOVE THIS UP DUE TO BLOATING 2023\par Bone density was normal last year will be repeated  or \par She states her vitamin D level was low and she is now on 3000 international units a day with her internist Dr. Wendy Quan-she asked for referral for [facility] PCP which is in progress-referral made by [facility] at pt's request-when they contacted her lm x 2 then declined\par Vaccines were reviewed with the patient and she will review this with Dr. Quan to see which vaccines she is due for including a second Covid booster and possibly Tdap and pneumonia as well as shingles\par Her colonoscopy may be due when she will check with her gastroenterologist she had polyps approximately 3-4 years ago at the last colonoscopy I am referring her to Dr. Xiomara Osman for follow-up colonoscopy (TUBULAR ADENOMA MAY 2023 WLL D/W DR OSMAN NEXT COLO INTERVAL?)\par Her eye exam is up-to-date\par Affirm and Pap and HPV were taken because of cervicitis-pap/hpv neg, rxd bv-veronique today: sent-rx to ameliorate freq of bv: veronique  NEG AND NO D/C TODAY NO INDICATIOIN OF INFECTION, PAP HPV NEG \par Urine analysis culture and cytology sent PVR 0 CC\par \par Appointment was made for her to see me in /WINTER [phone] all questions were answered to the patient's apparent satisfaction\par jmr\par

## 2023-06-13 LAB
APPEARANCE: CLEAR
BACTERIA UR CULT: NORMAL
BACTERIA: NEGATIVE /HPF
BILIRUBIN URINE: NEGATIVE
BLOOD URINE: NEGATIVE
CAST: 0 /LPF
COLOR: YELLOW
EPITHELIAL CELLS: 6 /HPF
GLUCOSE QUALITATIVE U: NEGATIVE MG/DL
KETONES URINE: NEGATIVE MG/DL
LEUKOCYTE ESTERASE URINE: NEGATIVE
MICROSCOPIC-UA: NORMAL
NITRITE URINE: NEGATIVE
PH URINE: 6
PROTEIN URINE: NORMAL MG/DL
RED BLOOD CELLS URINE: 1 /HPF
SPECIFIC GRAVITY URINE: 1.03
UROBILINOGEN URINE: 0.2 MG/DL
WHITE BLOOD CELLS URINE: 1 /HPF

## 2023-06-15 LAB — URINE CYTOLOGY: NORMAL

## 2023-06-16 ENCOUNTER — NON-APPOINTMENT (OUTPATIENT)
Age: 68
End: 2023-06-16

## 2023-07-03 ENCOUNTER — RESULT REVIEW (OUTPATIENT)
Age: 68
End: 2023-07-03

## 2023-07-03 ENCOUNTER — APPOINTMENT (OUTPATIENT)
Dept: MAMMOGRAPHY | Facility: IMAGING CENTER | Age: 68
End: 2023-07-03
Payer: MEDICARE

## 2023-07-03 ENCOUNTER — OUTPATIENT (OUTPATIENT)
Dept: OUTPATIENT SERVICES | Facility: HOSPITAL | Age: 68
LOS: 1 days | End: 2023-07-03
Payer: MEDICARE

## 2023-07-03 ENCOUNTER — APPOINTMENT (OUTPATIENT)
Dept: ULTRASOUND IMAGING | Facility: IMAGING CENTER | Age: 68
End: 2023-07-03

## 2023-07-03 DIAGNOSIS — Z98.49 CATARACT EXTRACTION STATUS, UNSPECIFIED EYE: Chronic | ICD-10-CM

## 2023-07-03 DIAGNOSIS — Z98.890 OTHER SPECIFIED POSTPROCEDURAL STATES: Chronic | ICD-10-CM

## 2023-07-03 DIAGNOSIS — Z00.8 ENCOUNTER FOR OTHER GENERAL EXAMINATION: ICD-10-CM

## 2023-07-03 DIAGNOSIS — Z98.89 OTHER SPECIFIED POSTPROCEDURAL STATES: Chronic | ICD-10-CM

## 2023-07-03 DIAGNOSIS — Z90.49 ACQUIRED ABSENCE OF OTHER SPECIFIED PARTS OF DIGESTIVE TRACT: Chronic | ICD-10-CM

## 2023-07-03 PROCEDURE — 77065 DX MAMMO INCL CAD UNI: CPT | Mod: 26,LT

## 2023-07-03 PROCEDURE — 76641 ULTRASOUND BREAST COMPLETE: CPT

## 2023-07-03 PROCEDURE — 77065 DX MAMMO INCL CAD UNI: CPT

## 2023-07-03 PROCEDURE — G0279: CPT

## 2023-07-03 PROCEDURE — 76641 ULTRASOUND BREAST COMPLETE: CPT | Mod: 26,LT,GZ

## 2023-07-03 PROCEDURE — G0279: CPT | Mod: 26

## 2023-07-05 ENCOUNTER — NON-APPOINTMENT (OUTPATIENT)
Age: 68
End: 2023-07-05

## 2023-07-14 ENCOUNTER — APPOINTMENT (OUTPATIENT)
Dept: ULTRASOUND IMAGING | Facility: IMAGING CENTER | Age: 68
End: 2023-07-14

## 2023-07-14 ENCOUNTER — APPOINTMENT (OUTPATIENT)
Dept: RADIOLOGY | Facility: IMAGING CENTER | Age: 68
End: 2023-07-14

## 2023-11-25 ENCOUNTER — NON-APPOINTMENT (OUTPATIENT)
Age: 68
End: 2023-11-25

## 2023-11-27 ENCOUNTER — APPOINTMENT (OUTPATIENT)
Dept: OBGYN | Facility: CLINIC | Age: 68
End: 2023-11-27

## 2024-01-03 ENCOUNTER — APPOINTMENT (OUTPATIENT)
Dept: ULTRASOUND IMAGING | Facility: IMAGING CENTER | Age: 69
End: 2024-01-03
Payer: MEDICARE

## 2024-01-03 ENCOUNTER — OUTPATIENT (OUTPATIENT)
Dept: OUTPATIENT SERVICES | Facility: HOSPITAL | Age: 69
LOS: 1 days | End: 2024-01-03
Payer: MEDICARE

## 2024-01-03 DIAGNOSIS — Z98.890 OTHER SPECIFIED POSTPROCEDURAL STATES: Chronic | ICD-10-CM

## 2024-01-03 DIAGNOSIS — Z90.49 ACQUIRED ABSENCE OF OTHER SPECIFIED PARTS OF DIGESTIVE TRACT: Chronic | ICD-10-CM

## 2024-01-03 DIAGNOSIS — N84.0 POLYP OF CORPUS UTERI: ICD-10-CM

## 2024-01-03 DIAGNOSIS — Z98.89 OTHER SPECIFIED POSTPROCEDURAL STATES: Chronic | ICD-10-CM

## 2024-01-03 DIAGNOSIS — N39.41 URGE INCONTINENCE: ICD-10-CM

## 2024-01-03 DIAGNOSIS — N63.20 UNSPECIFIED LUMP IN THE LEFT BREAST, UNSPECIFIED QUADRANT: ICD-10-CM

## 2024-01-03 DIAGNOSIS — Z98.49 CATARACT EXTRACTION STATUS, UNSPECIFIED EYE: Chronic | ICD-10-CM

## 2024-01-03 PROCEDURE — 76856 US EXAM PELVIC COMPLETE: CPT | Mod: 26

## 2024-01-03 PROCEDURE — 76856 US EXAM PELVIC COMPLETE: CPT

## 2024-01-03 PROCEDURE — 76775 US EXAM ABDO BACK WALL LIM: CPT | Mod: 26

## 2024-01-03 PROCEDURE — 76830 TRANSVAGINAL US NON-OB: CPT | Mod: 26

## 2024-01-03 PROCEDURE — 76830 TRANSVAGINAL US NON-OB: CPT

## 2024-01-03 PROCEDURE — 76775 US EXAM ABDO BACK WALL LIM: CPT

## 2024-01-22 ENCOUNTER — OUTPATIENT (OUTPATIENT)
Dept: OUTPATIENT SERVICES | Facility: HOSPITAL | Age: 69
LOS: 1 days | End: 2024-01-22
Payer: MEDICARE

## 2024-01-22 ENCOUNTER — APPOINTMENT (OUTPATIENT)
Dept: ULTRASOUND IMAGING | Facility: IMAGING CENTER | Age: 69
End: 2024-01-22
Payer: MEDICARE

## 2024-01-22 ENCOUNTER — APPOINTMENT (OUTPATIENT)
Dept: MAMMOGRAPHY | Facility: IMAGING CENTER | Age: 69
End: 2024-01-22
Payer: MEDICARE

## 2024-01-22 ENCOUNTER — APPOINTMENT (OUTPATIENT)
Dept: RADIOLOGY | Facility: IMAGING CENTER | Age: 69
End: 2024-01-22
Payer: MEDICARE

## 2024-01-22 ENCOUNTER — RESULT REVIEW (OUTPATIENT)
Age: 69
End: 2024-01-22

## 2024-01-22 DIAGNOSIS — Z90.49 ACQUIRED ABSENCE OF OTHER SPECIFIED PARTS OF DIGESTIVE TRACT: Chronic | ICD-10-CM

## 2024-01-22 DIAGNOSIS — Z98.89 OTHER SPECIFIED POSTPROCEDURAL STATES: Chronic | ICD-10-CM

## 2024-01-22 DIAGNOSIS — Z00.8 ENCOUNTER FOR OTHER GENERAL EXAMINATION: ICD-10-CM

## 2024-01-22 DIAGNOSIS — Z98.49 CATARACT EXTRACTION STATUS, UNSPECIFIED EYE: Chronic | ICD-10-CM

## 2024-01-22 DIAGNOSIS — Z12.39 ENCOUNTER FOR OTHER SCREENING FOR MALIGNANT NEOPLASM OF BREAST: ICD-10-CM

## 2024-01-22 DIAGNOSIS — Z98.890 OTHER SPECIFIED POSTPROCEDURAL STATES: Chronic | ICD-10-CM

## 2024-01-22 PROCEDURE — 76641 ULTRASOUND BREAST COMPLETE: CPT | Mod: 26,50,GY

## 2024-01-22 PROCEDURE — 77063 BREAST TOMOSYNTHESIS BI: CPT | Mod: 26

## 2024-01-22 PROCEDURE — 77067 SCR MAMMO BI INCL CAD: CPT | Mod: 26

## 2024-01-22 PROCEDURE — 77063 BREAST TOMOSYNTHESIS BI: CPT

## 2024-01-22 PROCEDURE — 77067 SCR MAMMO BI INCL CAD: CPT

## 2024-01-22 PROCEDURE — 77085 DXA BONE DENSITY AXL VRT FX: CPT | Mod: 26

## 2024-01-22 PROCEDURE — 77085 DXA BONE DENSITY AXL VRT FX: CPT

## 2024-01-22 PROCEDURE — 76641 ULTRASOUND BREAST COMPLETE: CPT

## 2024-02-07 ENCOUNTER — APPOINTMENT (OUTPATIENT)
Dept: OBGYN | Facility: CLINIC | Age: 69
End: 2024-02-07
Payer: MEDICARE

## 2024-02-07 ENCOUNTER — NON-APPOINTMENT (OUTPATIENT)
Age: 69
End: 2024-02-07

## 2024-02-07 VITALS
DIASTOLIC BLOOD PRESSURE: 83 MMHG | HEIGHT: 64 IN | SYSTOLIC BLOOD PRESSURE: 143 MMHG | BODY MASS INDEX: 27.14 KG/M2 | HEART RATE: 106 BPM | WEIGHT: 159 LBS

## 2024-02-07 DIAGNOSIS — R92.30 DENSE BREASTS, UNSPECIFIED: ICD-10-CM

## 2024-02-07 DIAGNOSIS — N39.41 URGE INCONTINENCE: ICD-10-CM

## 2024-02-07 DIAGNOSIS — N72 INFLAMMATORY DISEASE OF CERVIX UTERI: ICD-10-CM

## 2024-02-07 DIAGNOSIS — N81.9 FEMALE GENITAL PROLAPSE, UNSPECIFIED: ICD-10-CM

## 2024-02-07 PROCEDURE — 99213 OFFICE O/P EST LOW 20 MIN: CPT

## 2024-02-07 NOTE — OB HISTORY
[Taking Estrogens] : is not taking estrogen replacement [Abnormal Pap Smear] : normal pap smear [Abnormal Bleeding] : without bleeding [Sexually Active] : is not sexually active

## 2024-02-07 NOTE — COUNSELING
[FreeTextEntry1] : Alysha 65-year-old para 4  4 with predominantly urge incontinence, small cystocele and generalized slight pelvic floor weakness and occasional stress incontinence.  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  She chooses physical therapy for now at [facility] and will see me back again in 6 to 8 months to assess her progress and other therapies will be added as necessary  Renal ultrasound was ordered to rule out structural issues including hydronephrosis, urine analysis culture and cytology was sent, postvoid residual is normal, and voiding diary was given.  Also literature provided and 3D and computer modeling utilized to demonstrate the patient's situation to her-normal pelvic and renal sono   Her gynecologist is Dr. Trudy Humphrey however there was a discharge present today and some cervical bleeding predominantly in all likelihood due to atrophy therefore Pap and affirm with HPV was taken and I will report this back to Dr. Huertas and to the patient-neg pap/hpv  affirm was + and treated, veronique today 9/10/21 with affirm and ua uc; pvr nl  Pelvic sonogram was done and revealed a 4.5 mm probable polyp as she has had polypectomy D&C x2 all benign in the past and she is aware that if she has any staining or bleeding she is to report this to me and/or Dr. Huertas and appropriate measures will be taken including investigation; some adnexal tenderness will re-order ta tv pelvic sono as ovaries were not seen at last sono and adnexal exam although nl with me and Dr. Gardiner, some gas present and difficult to get deep in to adnexae. also fh+M ov ca pm-NORMAL PELVIC AND RENAL SONO AS ABOVE RPT AS CLINICALLY INDICATED  The patient has had the opportunity to ask questions which have been answered to her apparent satisfaction KEILY  Patient comes in today for test of cure for bacterial vaginosis admitting that she only used the medication for 1 night and I will repeat the test of cure today and it is pending if it still positive she will complete the course another 4 nights and a test of cure sometime in September or October and a follow-up in January for her biannual visit, she is going to continue to try physical therapy and make an appointment for formal physical therapy at [facility] physical therapy for her incontinence and pelvic organ prolapse, additionally if her test of cure is negative I will just monitor her again for that in .  All questions answered to the patient's apparent satisfaction JMR  2024 update Patient presents for follow-up visit having completed her first course with physical therapy at [facility] physical therapy and I have also suggested some electrical stimulation transvaginally due to the urge component-she wakes up on the average of twice per night however she does not go to sleep until 2 AM Due to family stress particularly with her to affected children she has been gaining weight eating carbohydrates more than she would like and not exercising.  She shared this information today and said she is her own support when I inquired as to whether or not she would like a referral for behavioral health to help her with her family stress she declines at present-we went over treatment options again today and she will go back to physical therapy repeat prescription has been given to the patient and she will try to go back and take some time out for herself and do physical therapy the other treatments would be pessary or surgery and she will try physical therapy at [facility] physical therapy first all of the contact information has been given to her-WILL NOW RESUME PT-NEW RX GIVEN-24 prefers to wait as she is busy w/her son (psych issues) timed voiding for now and bogdan 2024 ua uc cytol sent, poct neg-pvr normal  Her physical exam is improved and that her pelvic organ prolapse is now mild to moderate rather than moderate in the central cystocele is somewhat improved see POP Q scores for details-however it is clear that the wait the extra weight is pressing on her bladder and further weakening the pelvic floor and connective tissue- -the cause of her leakage is basically urethral detrusor facilitative reflex with the urge component neuromuscular and muscular fascial tearing leading to the pelvic organ prolapse which contributes to the stress component-again this is due to loop 2 efferent stim and parasympathetic stimulation with a funneled bladder neck  Treatment options include continued physical therapy which she is going to be continuing, weight loss and dietary adjustment.  This also includes doing nothing pessary medications or surgery and she chooses continued physical therapy and possibly posterior tibial nerve stimulation as well as weight loss and exercise-will try timed voiding for now and pfme  She is now going to be seeing me for GYN care and her annual visit with me today was normal- normal breast and pelvic exam except for the prolapse.  Urine analysis culture and cytology was sent pvr nl 0 cc's Mammogram-HAVING THIS EARLY (JULY) DUE TO NEW CYSTIC AREA L AT 2 O'CLOCK 4 CM FA, 2 X 2 CM CYSTIC AREA NON-TENDER-MAMMO/SONO/VISIT TO  SURGEON FARIBA LÓPEZ AFTER M/S-seen-m/s normal  rpt  discharged from [facility] Pelvic and renal sono normal  Bone density was normal  She states her vitamin D level was low and she is now on 3000 international units a day with her internist Dr. Wendy Quan-up to date  annual pex Vaccines were reviewed with the patient and she will review this with Dr. Quan to see which vaccines she is due for including a second Covid booster and possibly Tdap and pneumonia as well as shingles Her colonoscopy may be due when she will check with her gastroenterologist she had polyps approximately 3-4 years ago at the last colonoscopy I am referring her to Dr. Xiomara Osman for follow-up colonoscopy (TUBULAR ADENOMA MAY 2023 WLL D/W DR OSMAN NEXT COLO INTERVAL?)-done normal/polyps-repeat 2-3 years Her eye exam is up-to-date Affirm and Pap and HPV were taken because of cervicitis-pap/hpv neg, rxd bv-veronique today: sent-rx to ameliorate freq of bv: veronique  NEG AND NO D/C TODAY NO INDICATION OF INFECTION, PAP HPV NEG  rpt  Urine analysis culture and cytology sent PVR 0 CC  Appointment was made for her to see me in 2024 all questions were answered to the patient's apparent satisfaction hui

## 2024-02-07 NOTE — HISTORY OF PRESENT ILLNESS
[FreeTextEntry1] : Alysha 65-year-old para 4  4 with complaints of urgency frequency, double voiding and occasional urge incontinence with nocturia 2-3 times per night.  Occasionally with a full bladder she does have some stress incontinence symptoms and no bulging or vaginal or pelvic pressure symptoms per se.  She is referred from gynecologist Dr. Trudy Huertas and read recent mammogram was normal pelvic ultrasound reveals a 4.5 mm probable endometrial polyp\par  She has had 2 D&Cs because of polyps and bleeding however she has not had bleeding in quite some time. [Mammogramdate] : 12/19 [PapSmeardate] : 12/19 [PGHxTotal] : 4 [[facility]] : 4 [facility] : 4

## 2024-02-08 LAB
APPEARANCE: CLEAR
BACTERIA: NEGATIVE /HPF
BILIRUBIN URINE: NEGATIVE
BLOOD URINE: NEGATIVE
CAST: 0 /LPF
COLOR: YELLOW
EPITHELIAL CELLS: 1 /HPF
GLUCOSE QUALITATIVE U: NEGATIVE MG/DL
KETONES URINE: NEGATIVE MG/DL
LEUKOCYTE ESTERASE URINE: ABNORMAL
MICROSCOPIC-UA: NORMAL
NITRITE URINE: NEGATIVE
PH URINE: 6
PROTEIN URINE: NEGATIVE MG/DL
RED BLOOD CELLS URINE: 0 /HPF
SPECIFIC GRAVITY URINE: 1.01
UROBILINOGEN URINE: 0.2 MG/DL
WHITE BLOOD CELLS URINE: 1 /HPF

## 2024-02-09 LAB
BACTERIA UR CULT: NORMAL
URINE CYTOLOGY: NORMAL

## 2024-04-05 NOTE — ASU DISCHARGE PLAN (ADULT/PEDIATRIC) - NSDISCH_ACTIVITY_ENDO_ALL_CORE_FT
No
As you may have been given sedative drugs and medications, you should not drive or operate heavy machinery the next 24 hours. You should avoid any heavy lifting, straining or running today. To the extent possible, defer any important decisions for the next 24 hours.

## 2024-05-07 ENCOUNTER — APPOINTMENT (OUTPATIENT)
Dept: OTOLARYNGOLOGY | Facility: CLINIC | Age: 69
End: 2024-05-07
Payer: MEDICARE

## 2024-05-07 VITALS
HEART RATE: 75 BPM | DIASTOLIC BLOOD PRESSURE: 81 MMHG | HEIGHT: 64 IN | SYSTOLIC BLOOD PRESSURE: 128 MMHG | WEIGHT: 160 LBS | BODY MASS INDEX: 27.31 KG/M2

## 2024-05-07 DIAGNOSIS — H93.13 TINNITUS, BILATERAL: ICD-10-CM

## 2024-05-07 DIAGNOSIS — H90.3 SENSORINEURAL HEARING LOSS, BILATERAL: ICD-10-CM

## 2024-05-07 PROCEDURE — 92504 EAR MICROSCOPY EXAMINATION: CPT

## 2024-05-07 PROCEDURE — 99213 OFFICE O/P EST LOW 20 MIN: CPT

## 2024-05-07 NOTE — PHYSICAL EXAM
[Binocular Microscopic Exam] : Binocular microscopic exam was performed [Rinne Test Air Conduction Persists > Bone Conduction Right] : air conduction greater than bone conduction on the right [Rinne Test Air Conduction Persists > Bone Conduction Left] : air conduction greater than bone conduction on the left [Hearing Lucas Test (Tuning Fork On Forehead)] : no lateralization of tone [Normal] : gait was normal [Hearing Loss Right Only] : normal [Hearing Loss Left Only] : normal [Nystagmus] : ~T no ~M nystagmus was seen [Fukuda Step Test] : Fukuda Step Test was Negative [Romberg's Sign] : Romberg's sign was absent [Fistula Sign] : Fistula Sign: Negative [Past-Pointing] : Past-Pointing: Negative [Sandra-Hallchioke] : [place]-Hallpike: Negative

## 2024-05-07 NOTE — HISTORY OF PRESENT ILLNESS
[de-identified] : 68 year old woman presents for annual follow up of b/l high frequency SNHL. Patient feels like she has fluid in the right ear for the past month. Frequently uses ear buds. Occasional tinnitus - not bothersome. Denies otalgia, otorrhea and infections.  Last audio: 5/04/23

## 2024-07-12 NOTE — PHYSICAL EXAM
[Chaperone Present] : A chaperone was present in the examining room during all aspects of the physical examination [No Acute Distress] : in no acute distress [Well developed] : well developed [Well Nourished] : ~L well nourished [Good Hygeine] : demonstrates good hygeine [Oriented x3] : oriented to person, place, and time [Normal Memory] : ~T memory was ~L unimpaired [Normal Mood/Affect] : mood and affect are normal [Normal Lung Sounds] : the lungs were clear to auscultation [Respirations regular] : ~T respiratory rate was regular [Rate & Rhythm Regular] : ~T heart rate and rhythm were normal [No Edema] : ~T edema was not present [Supple] : ~T the neck demonstrated no ~M decrease in suppleness [Thyroid Normal] : the thyroid ~T showed no abnormalities [Symmetrical] : the neck was ~L symmetrical [Mass] : no breast mass [Tender] : no tenderness [Nipple Discharge] : no nipple discharge [Mass (___ Cm)] : no ~M [unfilled] abdominal mass was palpated [Tenderness] : ~T no ~M abdominal tenderness observed [H/Smegaly] : no hepatosplenomegaly [Supraclavicular LAD] : no adenopathy noted in supraclavicular lymph nodes [Axillary LAD] : no adenopathy was noted in axillary nodes [Inguinal LAD] : no adenopathy was noted in the inguinal lymph nodes [Warm and Dry] : was warm and dry to touch [Turgor Normal] : skin turgor ~T was normal [Rash/Lesion] : no rash or lesion was noted [Normal Gait] : gait was normal [No Joint Swelling] : there was no joint swelling [No Clubbing, Cyanosis] : no clubbing or cyanosis of the fingernails [Normal Strength/Tone] : muscle strength and tone were normal [Vulvar Atrophy] : vulvar atrophy [Normal Appearance] : general appearance was normal [Atrophy] : atrophy [Estrogen Effect] : no estrogen effect was observed [4] : 4 [Aa ____] : Aa [unfilled] [Ba ____] : Ba [unfilled] [C ____] : C [unfilled] [GH ____] : GH [unfilled] [PB ____] : PB [unfilled] [TVL ____] : TVL  [unfilled] [Ap ____] : Ap [unfilled] [Bp ____] : Bp [unfilled] [D ____] : D [unfilled] [] : I [Normal rectal exam] : was normal [None] : no [Examination Of The Breasts] : a normal appearance [No Discharge] : no discharge [Breast Mass Left Breast ___cm] : no mass was palpable [___cm] : a ~M [unfilled] ~Ucm superior lateral quadrant mass was palpated [Labia Majora] : normal [Labia Minora] : normal [Cystocele] : a cystocele [Normal] : normal [Uterine Adnexae] : normal [No Tenderness] : no tenderness [Nl Sphincter Tone] : normal sphincter tone [FreeTextEntry4] : grade 1 cystocele, no prolapse

## 2024-07-12 NOTE — COUNSELING
[FreeTextEntry1] : Alysha 65-year-old para 4  4 with predominantly urge incontinence, small cystocele and generalized slight pelvic floor weakness and occasional stress incontinence.  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  She chooses physical therapy for now at [facility] and will see me back again in 6 to 8 months to assess her progress and other therapies will be added as necessary  Renal ultrasound was ordered to rule out structural issues including hydronephrosis, urine analysis culture and cytology was sent, postvoid residual is normal, and voiding diary was given.  Also literature provided and 3D and computer modeling utilized to demonstrate the patient's situation to her-normal pelvic and renal sono   Her gynecologist is Dr. Trudy Humphrey however there was a discharge present today and some cervical bleeding predominantly in all likelihood due to atrophy therefore Pap and affirm with HPV was taken and I will report this back to Dr. Huertas and to the patient-neg pap/hpv  affirm was + and treated, veronique today 9/10/21 with affirm and ua uc; pvr nl  Pelvic sonogram was done and revealed a 4.5 mm probable polyp as she has had polypectomy D&C x2 all benign in the past and she is aware that if she has any staining or bleeding she is to report this to me and/or Dr. Huertas and appropriate measures will be taken including investigation; some adnexal tenderness will re-order ta tv pelvic sono as ovaries were not seen at last sono and adnexal exam although nl with me and Dr. Gardiner, some gas present and difficult to get deep in to adnexae. also fh+M ov ca pm-NORMAL PELVIC AND RENAL SONO AS ABOVE RPT AS CLINICALLY INDICATED  The patient has had the opportunity to ask questions which have been answered to her apparent satisfaction KEILY  Patient comes in today for test of cure for bacterial vaginosis admitting that she only used the medication for 1 night and I will repeat the test of cure today and it is pending if it still positive she will complete the course another 4 nights and a test of cure sometime in September or October and a follow-up in January for her biannual visit, she is going to continue to try physical therapy and make an appointment for formal physical therapy at [facility] physical therapy for her incontinence and pelvic organ prolapse, additionally if her test of cure is negative I will just monitor her again for that in .  All questions answered to the patient's apparent satisfaction JMR  2024 update Patient presents for follow-up visit having completed her first course with physical therapy at [facility] physical therapy and I have also suggested some electrical stimulation transvaginally due to the urge component-she wakes up on the average of twice per night however she does not go to sleep until 2 AM Due to family stress particularly with her to affected children she has been gaining weight eating carbohydrates more than she would like and not exercising.  She shared this information today and said she is her own support when I inquired as to whether or not she would like a referral for behavioral health to help her with her family stress she declines at present-we went over treatment options again today and she will go back to physical therapy repeat prescription has been given to the patient and she will try to go back and take some time out for herself and do physical therapy the other treatments would be pessary or surgery and she will try physical therapy at [facility] physical therapy first all of the contact information has been given to her-WILL NOW RESUME PT-NEW RX GIVEN-24 prefers to wait as she is busy w/her son (psych issues) timed voiding for now and bogdan 2024 ua uc cytol sent, poct neg-pvr normal  Her physical exam is improved and that her pelvic organ prolapse is now mild to moderate rather than moderate in the central cystocele is somewhat improved see POP Q scores for details-however it is clear that the wait the extra weight is pressing on her bladder and further weakening the pelvic floor and connective tissue- -the cause of her leakage is basically urethral detrusor facilitative reflex with the urge component neuromuscular and muscular fascial tearing leading to the pelvic organ prolapse which contributes to the stress component-again this is due to loop 2 efferent stim and parasympathetic stimulation with a funneled bladder neck  Treatment options include continued physical therapy which she is going to be continuing, weight loss and dietary adjustment.  This also includes doing nothing pessary medications or surgery and she chooses continued physical therapy and possibly posterior tibial nerve stimulation as well as weight loss and exercise-will try timed voiding for now and pfme  She is now going to be seeing me for GYN care and her annual visit with me today was normal- normal breast and pelvic exam except for the prolapse.  Urine analysis culture and cytology was sent pvr nl 0 cc's Mammogram-HAVING THIS EARLY (JULY) DUE TO NEW CYSTIC AREA L AT 2 O'CLOCK 4 CM FA, 2 X 2 CM CYSTIC AREA NON-TENDER-MAMMO/SONO/VISIT TO  SURGEON FARIBA LÓPEZ AFTER M/S-seen-m/s normal  rpt  discharged from [facility] Pelvic and renal sono normal  Bone density was normal  She states her vitamin D level was low and she is now on 3000 international units a day with her internist Dr. Wendy Quan-up to date  annual pex Vaccines were reviewed with the patient and she will review this with Dr. Quan to see which vaccines she is due for including a second Covid booster and possibly Tdap and pneumonia as well as shingles Her colonoscopy may be due when she will check with her gastroenterologist she had polyps approximately 3-4 years ago at the last colonoscopy I am referring her to Dr. Xiomara Osman for follow-up colonoscopy (TUBULAR ADENOMA MAY 2023 WLL D/W DR OSMAN NEXT COLO INTERVAL?)-done normal/polyps-repeat 2-3 years Her eye exam is up-to-date Affirm and Pap and HPV were taken because of cervicitis-pap/hpv neg, rxd bv-veronique today: sent-rx to ameliorate freq of bv: veronique  NEG AND NO D/C TODAY NO INDICATION OF INFECTION, PAP HPV NEG  rpt  Urine analysis culture and cytology sent PVR 0 CC  Appointment was made for her to see me in 2024 all questions were answered to the patient's apparent satisfaction uhi

## 2024-07-12 NOTE — HISTORY OF PRESENT ILLNESS
[FreeTextEntry1] : Alysha 65-year-old para 4  4 with complaints of urgency frequency, double voiding and occasional urge incontinence with nocturia 2-3 times per night.  Occasionally with a full bladder she does have some stress incontinence symptoms and no bulging or vaginal or pelvic pressure symptoms per se.  She is referred from gynecologist Dr. Trudy Huertas and read recent mammogram was normal pelvic ultrasound reveals a 4.5 mm probable endometrial polyp\par  She has had 2 D&Cs because of polyps and bleeding however she has not had bleeding in quite some time. [LMP unknown] : LMP unknown [postmenopausal] : postmenopausal [N] : Patient is not sexually active [Y] : Positive pregnancy history [Mammogramdate] : 12/19 [PapSmeardate] : 12/19 [PGHxTotal] : 4 [[facility] [[facility]] : 4

## 2024-08-12 ENCOUNTER — NON-APPOINTMENT (OUTPATIENT)
Age: 69
End: 2024-08-12

## 2024-08-12 ENCOUNTER — APPOINTMENT (OUTPATIENT)
Dept: OBGYN | Facility: CLINIC | Age: 69
End: 2024-08-12
Payer: MEDICARE

## 2024-08-12 VITALS
HEIGHT: 64 IN | SYSTOLIC BLOOD PRESSURE: 134 MMHG | DIASTOLIC BLOOD PRESSURE: 76 MMHG | WEIGHT: 158 LBS | BODY MASS INDEX: 26.98 KG/M2 | HEART RATE: 90 BPM

## 2024-08-12 DIAGNOSIS — R35.1 NOCTURIA: ICD-10-CM

## 2024-08-12 DIAGNOSIS — N39.41 URGE INCONTINENCE: ICD-10-CM

## 2024-08-12 DIAGNOSIS — N81.9 FEMALE GENITAL PROLAPSE, UNSPECIFIED: ICD-10-CM

## 2024-08-12 DIAGNOSIS — Z80.41 FAMILY HISTORY OF MALIGNANT NEOPLASM OF OVARY: ICD-10-CM

## 2024-08-12 DIAGNOSIS — N95.2 POSTMENOPAUSAL ATROPHIC VAGINITIS: ICD-10-CM

## 2024-08-12 DIAGNOSIS — Z80.0 FAMILY HISTORY OF MALIGNANT NEOPLASM OF DIGESTIVE ORGANS: ICD-10-CM

## 2024-08-12 LAB
BILIRUB UR QL STRIP: NORMAL
CLARITY UR: CLEAR
COLLECTION METHOD: NORMAL
GLUCOSE UR-MCNC: NORMAL
HCG UR QL: 0.2 EU/DL
HGB UR QL STRIP.AUTO: NORMAL
KETONES UR-MCNC: NORMAL
LEUKOCYTE ESTERASE UR QL STRIP: NORMAL
NITRITE UR QL STRIP: NORMAL
PH UR STRIP: 5.5
PROT UR STRIP-MCNC: NORMAL
SP GR UR STRIP: 1.03

## 2024-08-12 PROCEDURE — 99459 PELVIC EXAMINATION: CPT

## 2024-08-12 PROCEDURE — 99214 OFFICE O/P EST MOD 30 MIN: CPT

## 2024-08-12 NOTE — PHYSICAL EXAM
[[id number]] : A chaperone was present during the pelvic exam. [FreeTextEntry2] : eloise  [Mass] : no breast mass [Tender] : no tenderness [Nipple Discharge] : no nipple discharge [Mass (___ Cm)] : no ~M [unfilled] abdominal mass was palpated [Tenderness] : ~T no ~M abdominal tenderness observed [H/Smegaly] : no hepatosplenomegaly [Supraclavicular LAD] : no adenopathy noted in supraclavicular lymph nodes [Axillary LAD] : no adenopathy was noted in axillary nodes [Inguinal LAD] : no adenopathy was noted in the inguinal lymph nodes [Rash/Lesion] : no rash or lesion was noted [Estrogen Effect] : no estrogen effect was observed [FreeTextEntry4] : grade 1 cystocele, no prolapse

## 2024-08-12 NOTE — COUNSELING
[FreeTextEntry1] : Alysha 65-year-old para 4  4 with predominantly urge incontinence, small cystocele and generalized slight pelvic floor weakness and occasional stress incontinence.  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  She chooses physical therapy for now at [facility] and will see me back again in 6 to 8 months to assess her progress and other therapies will be added as necessary  Renal ultrasound was ordered to rule out structural issues including hydronephrosis, urine analysis culture and cytology was sent, postvoid residual is normal, and voiding diary was given.  Also literature provided and 3D and computer modeling utilized to demonstrate the patient's situation to her-normal pelvic and renal sono   Her gynecologist is Dr. Trudy Humphrey however there was a discharge present today and some cervical bleeding predominantly in all likelihood due to atrophy therefore Pap and affirm with HPV was taken and I will report this back to Dr. Huertas and to the patient-neg pap/hpv  affirm was + and treated, veronique today 9/10/21 with affirm and ua uc; pvr nl  Pelvic sonogram was done and revealed a 4.5 mm probable polyp as she has had polypectomy D&C x2 all benign in the past and she is aware that if she has any staining or bleeding she is to report this to me and/or Dr. Huertas and appropriate measures will be taken including investigation; some adnexal tenderness will re-order ta tv pelvic sono as ovaries were not seen at last sono and adnexal exam although nl with me and Dr. Gardiner, some gas present and difficult to get deep in to adnexae. also fh+M ov ca pm-NORMAL PELVIC AND RENAL SONO AS ABOVE RPT AS CLINICALLY INDICATED  The patient has had the opportunity to ask questions which have been answered to her apparent satisfaction KEILY  Patient comes in today for test of cure for bacterial vaginosis admitting that she only used the medication for 1 night and I will repeat the test of cure today and it is pending if it still positive she will complete the course another 4 nights and a test of cure sometime in September or October and a follow-up in January for her biannual visit, she is going to continue to try physical therapy and make an appointment for formal physical therapy at [facility] physical therapy for her incontinence and pelvic organ prolapse, additionally if her test of cure is negative I will just monitor her again for that in .  All questions answered to the patient's apparent satisfaction JMR  Aug 12, 2024 update Patient presents for follow-up visit having completed her first course with physical therapy at [facility] physical therapy -referral given at pt request today for second course of pt, PFME taught and reinforced again, timed voiding, etc, ua neg today, poct normal, and I have also suggested some electrical stimulation transvaginally due to the urge component-she wakes up on the average of twice per night however she does not go to sleep until 12 AM Due to family stress particularly with her to affected children she has been gaining weight eating carbohydrates more than she would like and not exercising.  She shared this information today and said she is her own support when I inquired as to whether or not she would like a referral for behavioral health to help her with her family stress she declines at present-we went over treatment options again today and she will go back to physical therapy repeat prescription has been given to the patient and she will try to go back and take some time out for herself and do physical therapy again the other treatments would be pessary, PTNS or surgery and she will try physical therapy at [facility] physical therapy first all of the contact information has been given to her-WILL NOW RESUME PT-NEW RX GIVEN-today ua uc cytol sent nl lv, poct neg today and -pvr normal  Her physical exam is improved and that her pelvic organ prolapse is now mild to moderate rather than moderate in the central cystocele is somewhat improved see POP Q scores for details-however it is clear that the wait the extra weight is pressing on her bladder and further weakening the pelvic floor and connective tissue- -the cause of her leakage is basically urethral detrusor facilitative reflex with the urge component neuromuscular and muscular fascial tearing leading to the pelvic organ prolapse which contributes to the stress component-again this is due to loop 2 efferent stim and parasympathetic stimulation with a funneled bladder neck  She is now going to be seeing me for GYN care and her annual visit with me today was normal- normal breast and pelvic exam except for the prolapse.Pap at 2 year kimmy or nv  Mammogram-nl rpt  (nl exam w/DR Neela Apple) Pelvic and renal sono normal  rpt  Bone density was normal  rpt [id number] She states her vitamin D level was low and she is now on 3000 international units a day with her internist Dr. Quan-she will follow up due for exam  Vaccines were reviewed with the patient and she will review this with Dr. Quan to see which vaccines she is due for Her colonoscopy may be due when she will check with her gastroenterologist she had polyps approximately 3-4 years ago at the last colonoscopy I am referring her to Dr. Xiomara Osman for follow-up colonoscopy (TUBULAR ADENOMA MAY 2023 WLL D/W DR OSMAN NEXT COLO INTERVAL?)-done normal/polyps-repeat 2-3 years) Her eye exam is up-to-date Affirm and Pap and HPV were taken because of cervicitis-pap/hpv neg, rxd bv-veronique today: sent-rx to ameliorate freq of bv: veronique  NEG AND NO D/C TODAY NO INDICATION OF INFECTION, PAP HPV NEG  rpt   Appointment was made for her to see me in /2024 all questions were answered to the patient's apparent satisfaction hui

## 2024-08-12 NOTE — HISTORY OF PRESENT ILLNESS
[FreeTextEntry1] : Alysha 65-year-old para 4  4 with complaints of urgency frequency, double voiding and occasional urge incontinence with nocturia 2-3 times per night.  Occasionally with a full bladder she does have some stress incontinence symptoms and no bulging or vaginal or pelvic pressure symptoms per se.  She is referred from gynecologist Dr. Trudy Huertas and read recent mammogram was normal pelvic ultrasound reveals a 4.5 mm probable endometrial polyp\par  She has had 2 D&Cs because of polyps and bleeding however she has not had bleeding in quite some time. [Mammogramdate] : 12/19 [PapSmeardate] : 12/19 [PGHxTotal] : 4 [[facility] 4 [[facility] : 4

## (undated) DEVICE — FOLEY HOLDER STATLOCK 2 WAY ADULT

## (undated) DEVICE — SNARE OVAL LOOP MICOR

## (undated) DEVICE — IRRIGATOR BIO SHIELD

## (undated) DEVICE — BIOPSY FORCEP RADIAL JAW 4 STANDARD WITH NEEDLE

## (undated) DEVICE — SOL INJ NS 0.9% 500ML 2 PORT

## (undated) DEVICE — SUCTION YANKAUER NO CONTROL VENT

## (undated) DEVICE — TUBING IV SET GRAVITY 3Y 100" MACRO

## (undated) DEVICE — ELCTR GROUNDING PAD ADULT COVIDIEN

## (undated) DEVICE — SNARE EXACTO COLD 9MMX230CM

## (undated) DEVICE — PACK IV START WITH CHG

## (undated) DEVICE — CATH IV SAFE BC 20G X 1.16" (PINK)

## (undated) DEVICE — SYR LUER LOK 50CC

## (undated) DEVICE — CLAMP BX HOT RAD JAW 3

## (undated) DEVICE — TUBING SUCTION 20FT

## (undated) DEVICE — CATH IV SAFE BC 22G X 1" (BLUE)

## (undated) DEVICE — TUBING SUCTION CONN 6FT STERILE

## (undated) DEVICE — SENSOR O2 FINGER ADULT

## (undated) DEVICE — POLY TRAP ETRAP

## (undated) DEVICE — BRUSH COLONOSCOPY CYTOLOGY

## (undated) DEVICE — FORCEP RADIAL JAW 4 JUMBO 2.8MM 3.2MM 240CM ORANGE DISP